# Patient Record
Sex: MALE | URBAN - METROPOLITAN AREA
[De-identification: names, ages, dates, MRNs, and addresses within clinical notes are randomized per-mention and may not be internally consistent; named-entity substitution may affect disease eponyms.]

---

## 2021-10-15 ENCOUNTER — EMERGENCY (EMERGENCY)
Facility: HOSPITAL | Age: 21
LOS: 1 days | Discharge: HOME | End: 2021-10-15
Attending: STUDENT IN AN ORGANIZED HEALTH CARE EDUCATION/TRAINING PROGRAM
Payer: COMMERCIAL

## 2021-10-15 VITALS — WEIGHT: 175.05 LBS

## 2021-10-15 DIAGNOSIS — F29 UNSPECIFIED PSYCHOSIS NOT DUE TO A SUBSTANCE OR KNOWN PHYSIOLOGICAL CONDITION: ICD-10-CM

## 2021-10-15 DIAGNOSIS — F19.10 OTHER PSYCHOACTIVE SUBSTANCE ABUSE, UNCOMPLICATED: ICD-10-CM

## 2021-10-15 LAB
ALBUMIN SERPL ELPH-MCNC: 4.4 G/DL — SIGNIFICANT CHANGE UP (ref 3.3–5.2)
ALP SERPL-CCNC: 84 U/L — SIGNIFICANT CHANGE UP (ref 40–120)
ALT FLD-CCNC: 30 U/L — SIGNIFICANT CHANGE UP
AMPHET UR-MCNC: NEGATIVE — SIGNIFICANT CHANGE UP
ANION GAP SERPL CALC-SCNC: 15 MMOL/L — SIGNIFICANT CHANGE UP (ref 5–17)
APAP SERPL-MCNC: <3 UG/ML — LOW (ref 10–26)
APPEARANCE UR: CLEAR — SIGNIFICANT CHANGE UP
AST SERPL-CCNC: 98 U/L — HIGH
BARBITURATES UR SCN-MCNC: NEGATIVE — SIGNIFICANT CHANGE UP
BASOPHILS # BLD AUTO: 0.05 K/UL — SIGNIFICANT CHANGE UP (ref 0–0.2)
BASOPHILS NFR BLD AUTO: 0.3 % — SIGNIFICANT CHANGE UP (ref 0–2)
BENZODIAZ UR-MCNC: NEGATIVE — SIGNIFICANT CHANGE UP
BILIRUB SERPL-MCNC: 1.5 MG/DL — SIGNIFICANT CHANGE UP (ref 0.4–2)
BILIRUB UR-MCNC: NEGATIVE — SIGNIFICANT CHANGE UP
BUN SERPL-MCNC: 8 MG/DL — SIGNIFICANT CHANGE UP (ref 8–20)
CALCIUM SERPL-MCNC: 9.5 MG/DL — SIGNIFICANT CHANGE UP (ref 8.6–10.2)
CHLORIDE SERPL-SCNC: 104 MMOL/L — SIGNIFICANT CHANGE UP (ref 98–107)
CO2 SERPL-SCNC: 21 MMOL/L — LOW (ref 22–29)
COCAINE METAB.OTHER UR-MCNC: NEGATIVE — SIGNIFICANT CHANGE UP
COLOR SPEC: YELLOW — SIGNIFICANT CHANGE UP
CREAT SERPL-MCNC: 0.82 MG/DL — SIGNIFICANT CHANGE UP (ref 0.5–1.3)
DIFF PNL FLD: NEGATIVE — SIGNIFICANT CHANGE UP
EOSINOPHIL # BLD AUTO: 0 K/UL — SIGNIFICANT CHANGE UP (ref 0–0.5)
EOSINOPHIL NFR BLD AUTO: 0 % — SIGNIFICANT CHANGE UP (ref 0–6)
ETHANOL SERPL-MCNC: <10 MG/DL — SIGNIFICANT CHANGE UP (ref 0–9)
GLUCOSE SERPL-MCNC: 114 MG/DL — HIGH (ref 70–99)
GLUCOSE UR QL: NEGATIVE MG/DL — SIGNIFICANT CHANGE UP
HCT VFR BLD CALC: 40.8 % — SIGNIFICANT CHANGE UP (ref 39–50)
HGB BLD-MCNC: 13.9 G/DL — SIGNIFICANT CHANGE UP (ref 13–17)
HIV 1 & 2 AB SERPL IA.RAPID: SIGNIFICANT CHANGE UP
IMM GRANULOCYTES NFR BLD AUTO: 0.4 % — SIGNIFICANT CHANGE UP (ref 0–1.5)
KETONES UR-MCNC: NEGATIVE — SIGNIFICANT CHANGE UP
LEUKOCYTE ESTERASE UR-ACNC: NEGATIVE — SIGNIFICANT CHANGE UP
LYMPHOCYTES # BLD AUTO: 1.82 K/UL — SIGNIFICANT CHANGE UP (ref 1–3.3)
LYMPHOCYTES # BLD AUTO: 10.9 % — LOW (ref 13–44)
MCHC RBC-ENTMCNC: 29.9 PG — SIGNIFICANT CHANGE UP (ref 27–34)
MCHC RBC-ENTMCNC: 34.1 GM/DL — SIGNIFICANT CHANGE UP (ref 32–36)
MCV RBC AUTO: 87.7 FL — SIGNIFICANT CHANGE UP (ref 80–100)
METHADONE UR-MCNC: NEGATIVE — SIGNIFICANT CHANGE UP
MONOCYTES # BLD AUTO: 0.68 K/UL — SIGNIFICANT CHANGE UP (ref 0–0.9)
MONOCYTES NFR BLD AUTO: 4.1 % — SIGNIFICANT CHANGE UP (ref 2–14)
NEUTROPHILS # BLD AUTO: 14.09 K/UL — HIGH (ref 1.8–7.4)
NEUTROPHILS NFR BLD AUTO: 84.3 % — HIGH (ref 43–77)
NITRITE UR-MCNC: NEGATIVE — SIGNIFICANT CHANGE UP
OPIATES UR-MCNC: NEGATIVE — SIGNIFICANT CHANGE UP
PCP SPEC-MCNC: SIGNIFICANT CHANGE UP
PCP UR-MCNC: NEGATIVE — SIGNIFICANT CHANGE UP
PH UR: 8 — SIGNIFICANT CHANGE UP (ref 5–8)
PLATELET # BLD AUTO: 327 K/UL — SIGNIFICANT CHANGE UP (ref 150–400)
POTASSIUM SERPL-MCNC: 3.8 MMOL/L — SIGNIFICANT CHANGE UP (ref 3.5–5.3)
POTASSIUM SERPL-SCNC: 3.8 MMOL/L — SIGNIFICANT CHANGE UP (ref 3.5–5.3)
PROT SERPL-MCNC: 7.4 G/DL — SIGNIFICANT CHANGE UP (ref 6.6–8.7)
PROT UR-MCNC: NEGATIVE MG/DL — SIGNIFICANT CHANGE UP
RBC # BLD: 4.65 M/UL — SIGNIFICANT CHANGE UP (ref 4.2–5.8)
RBC # FLD: 12.7 % — SIGNIFICANT CHANGE UP (ref 10.3–14.5)
SALICYLATES SERPL-MCNC: <0.6 MG/DL — LOW (ref 10–20)
SODIUM SERPL-SCNC: 140 MMOL/L — SIGNIFICANT CHANGE UP (ref 135–145)
SP GR SPEC: 1.01 — SIGNIFICANT CHANGE UP (ref 1.01–1.02)
THC UR QL: POSITIVE
UROBILINOGEN FLD QL: NEGATIVE MG/DL — SIGNIFICANT CHANGE UP
WBC # BLD: 16.7 K/UL — HIGH (ref 3.8–10.5)
WBC # FLD AUTO: 16.7 K/UL — HIGH (ref 3.8–10.5)

## 2021-10-15 PROCEDURE — 93010 ELECTROCARDIOGRAM REPORT: CPT

## 2021-10-15 PROCEDURE — 99218: CPT

## 2021-10-15 PROCEDURE — 90792 PSYCH DIAG EVAL W/MED SRVCS: CPT

## 2021-10-15 RX ORDER — HALOPERIDOL DECANOATE 100 MG/ML
5 INJECTION INTRAMUSCULAR EVERY 6 HOURS
Refills: 0 | Status: DISCONTINUED | OUTPATIENT
Start: 2021-10-15 | End: 2021-10-20

## 2021-10-15 RX ORDER — KETAMINE HYDROCHLORIDE 100 MG/ML
300 INJECTION INTRAMUSCULAR; INTRAVENOUS ONCE
Refills: 0 | Status: DISCONTINUED | OUTPATIENT
Start: 2021-10-15 | End: 2021-10-15

## 2021-10-15 RX ORDER — OLANZAPINE 15 MG/1
5 TABLET, FILM COATED ORAL ONCE
Refills: 0 | Status: COMPLETED | OUTPATIENT
Start: 2021-10-15 | End: 2021-10-15

## 2021-10-15 RX ORDER — DIPHENHYDRAMINE HCL 50 MG
50 CAPSULE ORAL EVERY 6 HOURS
Refills: 0 | Status: DISCONTINUED | OUTPATIENT
Start: 2021-10-15 | End: 2021-10-20

## 2021-10-15 RX ADMIN — KETAMINE HYDROCHLORIDE 300 MILLIGRAM(S): 100 INJECTION INTRAMUSCULAR; INTRAVENOUS at 13:20

## 2021-10-15 RX ADMIN — OLANZAPINE 5 MILLIGRAM(S): 15 TABLET, FILM COATED ORAL at 19:45

## 2021-10-15 NOTE — ED ADULT NURSE NOTE - CHIEF COMPLAINT QUOTE
as per ems pt was found outside his house and pt probably smoked something pt agitated yelling and uncooperative MD called and security called

## 2021-10-15 NOTE — ED PROVIDER NOTE - CLINICAL SUMMARY MEDICAL DECISION MAKING FREE TEXT BOX
22 yo male reportedly used drugs. Acute agitation, given Ketamine for patient and staff safety. Patient tachycardic but stable. Will reassess.

## 2021-10-15 NOTE — ED ADULT TRIAGE NOTE - CHIEF COMPLAINT QUOTE
as per ems pt was found outside his house and pt probably smoked something pt agitated yelling and uncooperative MD called as per ems pt was found outside his house and pt probably smoked something pt agitated yelling and uncooperative MD called and security called

## 2021-10-15 NOTE — ED ADULT NURSE NOTE - NSIMPLEMENTINTERV_GEN_ALL_ED
Implemented All Universal Safety Interventions:  [place] to call system. Call bell, personal items and telephone within reach. Instruct patient to call for assistance. Room bathroom lighting operational. Non-slip footwear when patient is off stretcher. Physically safe environment: no spills, clutter or unnecessary equipment. Stretcher in lowest position, wheels locked, appropriate side rails in place.

## 2021-10-15 NOTE — ED BEHAVIORAL HEALTH ASSESSMENT NOTE - HPI (INCLUDE ILLNESS QUALITY, SEVERITY, DURATION, TIMING, CONTEXT, MODIFYING FACTORS, ASSOCIATED SIGNS AND SYMPTOMS)
20 yo  male, born in , mother from [facility], HS grad, unemployed, lives with family, no formal PPH, tx, psych admissions, suicide attempt, no family h/o mental illness, unclear drug use h/o,  per EMS reports Pt has h/o umang dust use, no PMH bib EMS activated by mother secondary to agitation aggression and bizarre behavior.  Pt presents distracted, staring at ceiling at times, states he has been "traumatized", unable to articulate, internally preoccupied appears to be responding to internal stimuli, endorses AH saying to "die".  States he is being "traumatized", presents anxious, paranoia, fearful and is irritable.   Mood is labile crying at times restless.  Pt disorganized thinking and confused, states he does not understand what is happening.  Admits to vaping "wax" which is a strong version of THC.  Pt unable to give history due to acuity of current mental status and active psychosis.  Zyprexa Zydis 5 mg ordered stat.  Pt requires 1 to 1 for safety and agitation.  Mother is primary historian.  Mother unsure if Pt is using drugs.  Mother believes Pt has "Schizophrenia because he told mother he is hearing voices.  Pt has not slept for 8 days.  She reports Pt talking to himself and laughing to self and sometimes shouting when is shower.  Mother reports increasingly bizarre behavior.  Two days ago would take a shower every 15 minutes, then would dress with a jacket but no clothes underneath jacket.  Pt paranoid, told mother kids are trying to come in the house and trying to get into his head.  Pt reported to be pacing, threatening to "fuck you up" when speaking to mother.  Today mother asked him to cut grass which he started to do, then stopped,  was laughing staring at brent, ran inside took off clothes and did not recognize his mother which is when mothr called 911.  Mother wants Pt admitted psychiatrically.  Mother reports Pt has not been eating.  Mother reports Pt is unable to hold a job since graduating [employer]. 20 yo  male, born in , mother from [facility], HS grad, unemployed, name provider by mother to be [employer] brenda 01, lives with family, no formal PPH, tx, psych admissions, suicide attempt, no family h/o mental illness, unclear drug use h/o,  per EMS reports Pt has h/o umang dust use, no PMH bib EMS activated by mother secondary to agitation aggression and bizarre behavior.  Pt presents distracted, staring at ceiling at times, states he has been "traumatized", unable to articulate, internally preoccupied appears to be responding to internal stimuli, endorses AH saying to "die".  States he is being "traumatized", presents anxious, paranoia, fearful and is irritable.   Mood is labile crying at times restless.  Pt disorganized thinking and confused, states he does not understand what is happening.  Admits to vaping "wax" which is a strong version of THC.  Pt unable to give history due to acuity of current mental status and active psychosis.  Zyprexa Zydis 5 mg ordered stat.  Pt requires 1 to 1 for safety and agitation.  Mother is primary historian.  Mother unsure if Pt is using drugs.  Mother believes Pt has "Schizophrenia because he told mother he is hearing voices.  Pt has not slept for 8 days.  She reports Pt talking to himself and laughing to self and sometimes shouting when is shower.  Mother reports increasingly bizarre behavior.  Two days ago would take a shower every 15 minutes, then would dress with a jacket but no clothes underneath jacket.  Pt paranoid, told mother kids are trying to come in the house and trying to get into his head.  Pt reported to be pacing, threatening to "fuck you up" when speaking to mother.  Today mother asked him to cut grass which he started to do, then stopped,  was laughing staring at brent, ran inside took off clothes and did not recognize his mother which is when mother called 911.  Mother wants Pt admitted psychiatrically.  Mother reports Pt has not been eating.  Mother reports Pt is unable to hold a job since graduating [employer].

## 2021-10-15 NOTE — ED PROVIDER NOTE - CARE PLAN
1 Principal Discharge DX:	Psychosis, unspecified psychosis type  Secondary Diagnosis:	Substance abuse

## 2021-10-15 NOTE — ED BEHAVIORAL HEALTH ASSESSMENT NOTE - RISK ASSESSMENT
RF incomplete history, substance use vs first break, AH to "die", agitation.  PF supportive mother no prior psych h/o Unable to determine Suicide Risk

## 2021-10-15 NOTE — ED PROVIDER NOTE - ATTENDING CONTRIBUTION TO CARE
I, Bull Jacob, performed a face to face bedside interview with this patient regarding history of present illness, review of symptoms and relevant past medical, social and family history.  I completed an independent physical examination. I have communicated the patient’s plan of care and disposition with the resident.  20 year old male initially presented with acute agitation after vaping, requiring medical mgt to protetc self and others. Now awake and interactive, states he has been hearing voices for some time and smokes ot cope.  Gen: NAD, well appearing  CV: RRR  Pul: CTA b/l  Abd: Soft, non-distended, non-tender  Neuro: no focal deficits  Pt to be place don obs for inpt psych

## 2021-10-15 NOTE — ED BEHAVIORAL HEALTH ASSESSMENT NOTE - VIOLENCE RISK FACTORS:
Feeling of being under threat and being unable to control threat/Substance abuse/Affective dysregulation

## 2021-10-15 NOTE — ED BEHAVIORAL HEALTH ASSESSMENT NOTE - NSPRESENTSXS_PSY_ALL_CORE
Depressed mood/Anhedonia/Psychosis/Severe anxiety, agitation or panic/Refusal or inability to complete safety plan

## 2021-10-15 NOTE — ED ADULT NURSE NOTE - HPI (INCLUDE ILLNESS QUALITY, SEVERITY, DURATION, TIMING, CONTEXT, MODIFYING FACTORS, ASSOCIATED SIGNS AND SYMPTOMS)
pt arrives to  unit.  in yellow gown.  pt is awake alert.  weeping.  clinging to his mother.  pt ambulated to intake room waned by security for contraband.  pt states I don't know why I am here.  unable to obtain much information at his time.  pt eyes wide open looking at staff.  pt to bathroom cooperative with urine specimen escorted to room .  make aware to stay over night. pt denies med/pph hx.  then stating I miss my girlfriend.  I miss my mom.  starring at this rn.  then silent.  I am sorry for anything I did.  pt offered food and po took both.  let resting at this time

## 2021-10-15 NOTE — ED PROVIDER NOTE - OBJECTIVE STATEMENT
22 yo male with no pmh presents to the ED for aggressive behavior. Patient was found outside his house, yelling. It is believed by police that he was using Elmer Dust due to history in the past. Patient yelling in ED jumped off stretcher, require treatment for agitation.

## 2021-10-15 NOTE — ED BEHAVIORAL HEALTH ASSESSMENT NOTE - DESCRIPTION
HS grad, [place], unemployed ICU Vital Signs Last 24 Hrs  T(C): 37.3 (15 Oct 2021 16:49), Max: 37.3 (15 Oct 2021 16:49)  T(F): 99.2 (15 Oct 2021 16:49), Max: 99.2 (15 Oct 2021 16:49)  HR: 100 (15 Oct 2021 16:49) (98 - 145)  BP: 130/60 (15 Oct 2021 16:49) (130/60 - 165/92)  BP(mean): --  ABP: --  ABP(mean): --  RR: 16 (15 Oct 2021 16:49) (16 - 22)  SpO2: 100% (15 Oct 2021 16:49) (100% - 100%) none

## 2021-10-15 NOTE — ED BEHAVIORAL HEALTH ASSESSMENT NOTE - DIFFERENTIAL
Substance induced psychosis Substance induced psychosis    COVID Exposure Screen- Patient    1.         *Have you had a COVID-19 test in the last 90 days?  ( x ) Yes  (  ) No   (  ) Unknown- Reason: _____    2.         *Have you tested positive for COVID-19 antibodies? (  ) Yes   (  ) No   ( x ) Unknown- Reason:    3.         *Have you received 2 doses of the COVID-19 vaccine? (  ) Yes   (x  ) No   (  ) Unknown- Reason: _____    4.         *In the past 10 days, have you been around anyone with a positive COVID-19 test?* (  ) Yes   (  ) No   ( x ) Unknown- Reason: ____    5.         *Have you been out of [place] within the past 10 days?* (  ) Yes   (x  ) No   (  ) Unknown- Reason: _____

## 2021-10-15 NOTE — ED BEHAVIORAL HEALTH ASSESSMENT NOTE - SUMMARY
20 yo  male, born in , mother from [facility], HS grad, unemployed, lives with family, no formal PPH, tx, psych admissions, suicide attempt, no family h/o mental illness, unclear drug use h/o,  per EMS reports Pt has h/o umang dust use, no PMH bib EMS activated by mother secondary to agitation aggression and bizarre behavior.  Pt presents distracted, staring at ceiling at times, states he has been "traumatized", unable to articulate, internally preoccupied appears to be responding to internal stimuli, endorses AH saying to "die".  States he is being "traumatized", presents anxious, paranoia, fearful and is irritable.   Mood is labile crying at times restless.  Pt disorganized thinking and confused, states he does not understand what is happening.  Admits to vaping "wax" which is a strong version of THC.  Pt unable to give history due to acuity of current mental status and active psychosis.  Zyprexa Zydis 5 mg ordered stat.  Pt requires 1 to 1 for safety and agitation.  Mother is primary historian.  Mother unsure if Pt is using drugs.  Mother believes Pt has "Schizophrenia because he told mother he is hearing voices.  Pt has not slept for 8 days.  She reports Pt talking to himself and laughing to self and sometimes shouting when is shower.  Mother reports increasingly bizarre behavior.  Two days ago would take a shower every 15 minutes, then would dress with a jacket but no clothes underneath jacket.  Pt paranoid, told mother kids are trying to come in the house and trying to get into his head.  Pt reported to be pacing, threatening to "fuck you up" when speaking to mother.  Today mother asked him to cut grass which he started to do, then stopped,  was laughing staring at brent, ran inside took off clothes and did not recognize his mother which is when mother called 911.  Mother wants Pt admitted psychiatrically.  Mother reports Pt has not been eating.  Mother reports Pt is unable to hold a job since graduating [employer].  Pt is a danger to self and others and requires in pt psych admission due to psychosis and for safety.  Await medical clearance and bed availability.

## 2021-10-15 NOTE — ED BEHAVIORAL HEALTH ASSESSMENT NOTE - DETAILS
Pt is verbally aggressive and threatening and hs been physically aggressive with mother. voices tell him to "die".  Did not answer when asked if voices tell him to hurt self to follow na Pt reports AH telling his to die".

## 2021-10-15 NOTE — ED CDU PROVIDER INITIAL DAY NOTE - OBJECTIVE STATEMENT
20 yo male with no pmh presents to the ED for aggressive behavior. Patient was found outside his house, yelling. It is believed by police that he was using Elmer Dust due to history in the past. Patient yelling in ED jumped off stretcher, require treatment for agitation.

## 2021-10-16 LAB
SARS-COV-2 RNA SPEC QL NAA+PROBE: SIGNIFICANT CHANGE UP

## 2021-10-16 PROCEDURE — 99224: CPT

## 2021-10-17 PROCEDURE — 99211 OFF/OP EST MAY X REQ PHY/QHP: CPT

## 2021-10-17 PROCEDURE — 99224: CPT

## 2021-10-17 RX ORDER — OLANZAPINE 15 MG/1
5 TABLET, FILM COATED ORAL AT BEDTIME
Refills: 0 | Status: DISCONTINUED | OUTPATIENT
Start: 2021-10-17 | End: 2021-10-20

## 2021-10-17 RX ADMIN — OLANZAPINE 5 MILLIGRAM(S): 15 TABLET, FILM COATED ORAL at 22:04

## 2021-10-17 NOTE — ED ADULT NURSE REASSESSMENT NOTE - COMFORT CARE
ambulated to bathroom/meal provided/plan of care explained
ambulated to bathroom/meal provided/plan of care explained/po fluids offered
meal provided/plan of care explained
plan of care explained
darkened lights

## 2021-10-17 NOTE — ED BEHAVIORAL HEALTH NOTE - BEHAVIORAL HEALTH NOTE
Social work note:  Pt is uninsured; limitations on what facility can accept for inpatient psych.   contacted [facility]; spoke with [facility]; spoke with Faye - no beds available. Worked paged A.NORMA at [facility] for bed census. SW to follow

## 2021-10-17 NOTE — ED BEHAVIORAL HEALTH NOTE - BEHAVIORAL HEALTH NOTE
PROGRESS NOTE: 10-17-21 @ 12:11  	  • Reason for Ongoing Consultation: 	    ID: 20 yo Male with HEALTH ISSUES - PROBLEM Dx:  Psychosis, unspecified psychosis type    Substance abuse            INTERVAL DATA:   • Interval Chief Complaint: "Its no big deal.."  • Interval History:   Pt report feeling better.  Denies AH yesterday and today.   Sitting up in chair watching TV.  Reports he no longer hears voices.  Thinking remains somewhat disorganized, poor insight into substance use, saying It is "no big deal".  Minimizing MJ use.  Mother reports psychotic s/s predated ingestion of wax he vaped.  Unclear if psychosis is secondary to MJ use or 1st break.  Will hold for reeval in am for possible psych admission.  Continue Zyprexa 5 mg hs    REVIEW OF SYSTEMS:   • Constitutional Symptoms	No complaints  • Eyes	No complaints  • Ears / Nose / Throat / Mouth	No complaints  • Cardiovascular	No complaints  • Respiratory	No complaints  • Gastrointestinal	No complaints  • Genitourinary	No complaints  • Musculoskeletal	No complaints  • Skin	No complaints  • Neurological	No complaints  • Psychiatric (see HPI)	See HPI  • Endocrine	No complaints  • Hematologic / Lymphatic	No complaints  • Allergic / Immunologic	No complaints    REVIEW OF VITALS/LABS/IMAGING/INVESTIGATIONS:   • Vital signs reviewed: Yes  • Vital Signs:	    T(C): 36.6 (10-17-21 @ 04:31), Max: 36.9 (10-16-21 @ 19:40)  HR: 65 (10-17-21 @ 04:31) (60 - 79)  BP: 105/65 (10-17-21 @ 04:31) (102/62 - 114/60)  RR: 18 (10-17-21 @ 04:31) (18 - 20)  SpO2: 99% (10-17-21 @ 04:31) (97% - 99%)    • Available labs reviewed: Yes  • Available Lab Results:                           13.9   16.70 )-----------( 327      ( 15 Oct 2021 18:11 )             40.8     10-15    140  |  104  |  8.0  ----------------------------<  114<H>  3.8   |  21.0<L>  |  0.82    Ca    9.5      15 Oct 2021 18:11    TPro  7.4  /  Alb  4.4  /  TBili  1.5  /  DBili  x   /  AST  98<H>  /  ALT  30  /  AlkPhos  84  10-15    LIVER FUNCTIONS - ( 15 Oct 2021 18:11 )  Alb: 4.4 g/dL / Pro: 7.4 g/dL / ALK PHOS: 84 U/L / ALT: 30 U/L / AST: 98 U/L / GGT: x             Urinalysis Basic - ( 15 Oct 2021 21:00 )    Color: Yellow / Appearance: Clear / S.010 / pH: x  Gluc: x / Ketone: Negative  / Bili: Negative / Urobili: Negative mg/dL   Blood: x / Protein: Negative mg/dL / Nitrite: Negative   Leuk Esterase: Negative / RBC: x / WBC x   Sq Epi: x / Non Sq Epi: x / Bacteria: x          MEDICATIONS:      PRN Medications:  • PRN Medications since last evaluation	  • PRN Details	    Current Medications:   diphenhydrAMINE Injectable 50 milliGRAM(s) IntraMuscular every 6 hours PRN  haloperidol    Injectable 5 milliGRAM(s) IntraMuscular every 6 hours PRN  LORazepam   Injectable 2 milliGRAM(s) IntraMuscular every 6 hours PRN     Medication Side Effects:  • Medication Side Effects or Adverse Reactions (new or ongoing)	None known    MENTAL STATUS EXAM:   • Level of Consciousness	Alert  • General Appearance	Well developed  • Body Habitus	Well nourished  • Hygiene	Good  • Grooming	Good  • Behavior	Cooperative  • Eye Contact	Good  • Relatedness	fair  • Impulse Control	Normal  • Muscle Tone / Strength	Normal muscle tone/strength  • Abnormal Movements	No abnormal movements  • Gait / Station	Normal gait / station  • Speech Volume	Normal  • Speech Rate	Normal  • Speech Spontaneity	Normal  • Speech Articulation	Normal  • Mood	Normal  • Affect Quality	Euthymic  • Affect Range	constricted  • Affect Congruence	Congruent  • Thought Process	some disorganization  • Thought Associations	Normal  • Thought Content	Unremarkable  • Perceptions	No abnormalities  • Oriented to Time	Yes  • Oriented to Place	Yes  • Oriented to Situation	Yes  • Oriented to Person	Yes  • Attention / Concentration	Normal  • Estimated Intelligence	Average  • Recent Memory	Normal  • Remote Memory	Normal  • Fund of Knowledge	Normal  • Language	No abnormalities noted  • Judgment (regarding everyday events)	Fair  • Insight (regarding psychiatric illness)	Fair    SUICIDALITY:   • Suicidality (Interval)	none known    HOMICIDALITY/AGGRESSION:   • Homicidality/Aggression	none known    DIAGNOSIS DSM-V:    Psychiatric Diagnosis (Corresponds to DSM-IV Axis I, II):   HEALTH ISSUES - PROBLEM Dx:  Psychosis, unspecified psychosis type    Substance abuse             Medical Diagnosis (Corresponds to DSM-IV Axis III):  • Axis III	      ASSESSMENT OF CURRENT CONDITION:   Summary (include case differential, formulation and patient response to therapy):   Continue somewhat disorganized with poor insight.  Psychosis improved.  No beds available for in pt admission.  Reeval in am for dispo.  Risk Assessment (consider static vs modifiable risk factors and protective factors; comment on level of risk for dangerous behavior):     PLAN  reeval am.  COVID Exposure Screen- Patient    1.         *Have you had a COVID-19 test in the last 90 days?  ( x ) Yes  (  ) No   (  ) Unknown- Reason: _____    2.         *Have you tested positive for COVID-19 antibodies? (  ) Yes   (  ) No   (x  ) Unknown- Reason:    3.         *Have you received 2 doses of the COVID-19 vaccine? (  ) Yes   ( x ) No   (  ) Unknown- Reason: _____    4.         *In the past 10 days, have you been around anyone with a positive COVID-19 test?* (  ) Yes   (  ) No   ( x ) Unknown- Reason: ____    5.         *Have you been out of [place] within the past 10 days?* (  ) Yes   (  x) No   (  ) Unknown- Reason: _____

## 2021-10-18 VITALS
OXYGEN SATURATION: 97 % | TEMPERATURE: 98 F | RESPIRATION RATE: 18 BRPM | HEART RATE: 85 BPM | SYSTOLIC BLOOD PRESSURE: 102 MMHG | DIASTOLIC BLOOD PRESSURE: 73 MMHG

## 2021-10-18 LAB
HCT VFR BLD CALC: 42.6 % — SIGNIFICANT CHANGE UP (ref 39–50)
HGB BLD-MCNC: 14.4 G/DL — SIGNIFICANT CHANGE UP (ref 13–17)
MCHC RBC-ENTMCNC: 31.1 PG — SIGNIFICANT CHANGE UP (ref 27–34)
MCHC RBC-ENTMCNC: 33.8 GM/DL — SIGNIFICANT CHANGE UP (ref 32–36)
MCV RBC AUTO: 92 FL — SIGNIFICANT CHANGE UP (ref 80–100)
PLATELET # BLD AUTO: 332 K/UL — SIGNIFICANT CHANGE UP (ref 150–400)
RBC # BLD: 4.63 M/UL — SIGNIFICANT CHANGE UP (ref 4.2–5.8)
RBC # FLD: 13.1 % — SIGNIFICANT CHANGE UP (ref 10.3–14.5)
SARS-COV-2 RNA SPEC QL NAA+PROBE: SIGNIFICANT CHANGE UP
WBC # BLD: 6.64 K/UL — SIGNIFICANT CHANGE UP (ref 3.8–10.5)
WBC # FLD AUTO: 6.64 K/UL — SIGNIFICANT CHANGE UP (ref 3.8–10.5)

## 2021-10-18 PROCEDURE — 99217: CPT

## 2021-10-18 PROCEDURE — U0005: CPT

## 2021-10-18 PROCEDURE — 85027 COMPLETE CBC AUTOMATED: CPT

## 2021-10-18 PROCEDURE — 85025 COMPLETE CBC W/AUTO DIFF WBC: CPT

## 2021-10-18 PROCEDURE — 86703 HIV-1/HIV-2 1 RESULT ANTBDY: CPT

## 2021-10-18 PROCEDURE — 80053 COMPREHEN METABOLIC PANEL: CPT

## 2021-10-18 PROCEDURE — 80307 DRUG TEST PRSMV CHEM ANLYZR: CPT

## 2021-10-18 PROCEDURE — 99285 EMERGENCY DEPT VISIT HI MDM: CPT | Mod: 25

## 2021-10-18 PROCEDURE — G0378: CPT

## 2021-10-18 PROCEDURE — U0003: CPT

## 2021-10-18 PROCEDURE — 81003 URINALYSIS AUTO W/O SCOPE: CPT

## 2021-10-18 PROCEDURE — 93005 ELECTROCARDIOGRAM TRACING: CPT

## 2021-10-18 PROCEDURE — 96372 THER/PROPH/DIAG INJ SC/IM: CPT

## 2021-10-18 PROCEDURE — 70450 CT HEAD/BRAIN W/O DYE: CPT | Mod: MA

## 2021-10-18 PROCEDURE — 36415 COLL VENOUS BLD VENIPUNCTURE: CPT

## 2021-10-18 PROCEDURE — 70450 CT HEAD/BRAIN W/O DYE: CPT | Mod: 26,MA

## 2021-10-18 PROCEDURE — 82962 GLUCOSE BLOOD TEST: CPT

## 2021-10-18 NOTE — ED ADULT NURSE REASSESSMENT NOTE - CONDITION
unchanged

## 2021-10-18 NOTE — ED CDU PROVIDER SUBSEQUENT DAY NOTE - NSICDXPASTMEDICALHX_GEN_ALL_CORE_FT
PAST MEDICAL HISTORY:  No pertinent past medical history

## 2021-10-18 NOTE — ED CDU PROVIDER SUBSEQUENT DAY NOTE - EYES NEGATIVE STATEMENT, MLM
no discharge, no irritation, no pain, no redness, and no visual changes.

## 2021-10-18 NOTE — ED CDU PROVIDER SUBSEQUENT DAY NOTE - ENMT, MLM
Airway patent. Nasal mucosa clear. Mouth with normal mucosa. Throat has no vesicles, no oropharyngeal exudates and uvula is midline.

## 2021-10-18 NOTE — ED ADULT NURSE REASSESSMENT NOTE - GENERAL PATIENT STATE
comfortable appearance/resting/sleeping
resting/sleeping
resting/sleeping
comfortable appearance/resting/sleeping
comfortable appearance/resting/sleeping
resting/sleeping
vitals obtained/cooperative/resting/sleeping
comfortable appearance/resting/sleeping
resting/sleeping
resting/sleeping
comfortable appearance/cooperative
comfortable appearance/cooperative/resting/sleeping
cooperative/resting/sleeping

## 2021-10-18 NOTE — ED CDU PROVIDER SUBSEQUENT DAY NOTE - HISTORY
No acute events overnight, pending psych placement No acute events overnight, pt arrived with hallcuinations that was then exacerbated by drug use. Acute agitation due to substance abuse overlying psychosis. He was sedated and is now calm and awaiting inpt psych

## 2021-10-18 NOTE — ED CDU PROVIDER SUBSEQUENT DAY NOTE - SKIN NEGATIVE STATEMENT, MLM
no abrasions, no jaundice, no lesions, no pruritis, and no rashes.

## 2021-10-18 NOTE — ED CDU PROVIDER DISPOSITION NOTE - CLINICAL COURSE
20 yo male with no pmh presents to the ED for aggressive behavior. Patient was found outside his house, yelling. It is believed by police that he was using Elmer Dust due to history in the past. Patient yelling in ED jumped off stretcher, require treatment for agitation.    patient medically cleared. patient seen by psych and require inpatient placement. patient accepted by [facility].

## 2021-10-18 NOTE — ED CDU PROVIDER SUBSEQUENT DAY NOTE - ENMT NEGATIVE STATEMENT, MLM
Ears: no ear pain and no hearing problems. Nose: no nasal congestion and no nasal drainage. Mouth/Throat: no dysphagia, no hoarseness and no throat pain. Neck: no lumps, no pain, no stiffness and no swollen glands

## 2021-10-18 NOTE — ED CDU PROVIDER SUBSEQUENT DAY NOTE - NSICDXPASTSURGICALHX_GEN_ALL_CORE_FT
PAST SURGICAL HISTORY:  No significant past surgical history

## 2021-10-18 NOTE — ED CDU PROVIDER SUBSEQUENT DAY NOTE - NSICDXNOPASTMEDICALHX_GEN_ALL_ED
<-- Click to add NO pertinent Past Medical History

## 2021-10-18 NOTE — ED ADULT NURSE REASSESSMENT NOTE - STATUS
awaiting transfer, no change
reassessment
awaiting transfer, no change

## 2021-10-18 NOTE — ED CDU PROVIDER SUBSEQUENT DAY NOTE - HEME LYMPH, MLM
No adenopathy or splenomegaly. No cervical or inguinal lymphadenopathy.

## 2021-10-18 NOTE — ED CDU PROVIDER SUBSEQUENT DAY NOTE - GASTROINTESTINAL NEGATIVE STATEMENT, MLM
no abdominal pain, no bloating, no constipation, no diarrhea, no nausea and no vomiting.

## 2021-10-18 NOTE — ED CDU PROVIDER SUBSEQUENT DAY NOTE - NEURO NEGATIVE STATEMENT, MLM
no loss of consciousness, no gait abnormality, no headache, no sensory deficits, and no weakness.

## 2021-10-18 NOTE — ED ADULT NURSE REASSESSMENT NOTE - NS ED NURSE REASSESS COMMENT FT1
Assumed care of patient at 1915  Pt alert and cooperative.   patient sitting in room watching tv.  no c/o  NAD noted patient requesting food to eat sandwich offered and tolerated well.  Will monitor and chart chagnes and maintain safe environment
Assumed care of patient.  Pt alert and cooperative.  Pt resting at long interval  Will monitor and chart changes and maintain safe environment
Patient out of bed sitting in chair.  Provided patient with hospital phone as requested and contacting his family.  No attempts to harm self or others.  Denies auditory hallucinations.  Safety maintained.
Pt re-swabbed for Covid for possible transfer in am
assumed care of pt. pt is awake, alert and oriented x3. pt denies si, hi, avh. pt is compliant with request for repeat lab work for cbc. pt is not in any acute distress. awaiting repeat ekg.
pt compliant with head ct. pt has been calm and cooperative, educated on plan of care to be transferred to [facility] for inpatient tx. pt has made no attempts to harm self or others, self isolating to his room. pt provided food tray with good appetite consuming all meals and po fluids.
Patient informed family called [facility] requesting he call them but patient declined.  Patient ate 50% of his meal. Patient withdrawn to his bed.  No attempts to harm self or others and safety maintained.
Patient resting and watching TV in his room much of the day. NP reassessed pt and determined pt should be held overnight to evaluate for possible d/c if pt continues to clear. Pt less disorganized, but remains bizarre at times. Ate well, polite and cooperative with care.
Assumed care of patient at 0715.  Patient resting in bed appears to be sleeping with no distress and regular non labored breathing noted.  Safety of patient maintained.
Patient denies auditory hallucinations but reports he feels "stressed".  No attempts to harm self or others.  Withdrawn to his room.  Patient provided breakfast and ate 100% of his meal.  Provided phone and contacted his mother.  Safety of patient maintained.

## 2021-10-18 NOTE — ED CDU PROVIDER SUBSEQUENT DAY NOTE - NORMAL, MLM
chikis all pertinent systems normal

## 2021-10-18 NOTE — ED CDU PROVIDER SUBSEQUENT DAY NOTE - MUSCULOSKELETAL NEGATIVE STATEMENT, MLM
no back pain, no gout, no musculoskeletal pain, no neck pain, and no weakness.

## 2021-10-18 NOTE — ED CDU PROVIDER SUBSEQUENT DAY NOTE - MEDICAL DECISION MAKING DETAILS
pt medically cleared and pending inpt psych
pt medically cleared and pending inpt psych
pt medically cleared and pending psych palcement

## 2021-10-18 NOTE — ED CDU PROVIDER SUBSEQUENT DAY NOTE - RESPIRATORY NEGATIVE STATEMENT, MLM
no chest pain, no cough, and no shortness of breath.

## 2021-10-20 NOTE — CHART NOTE - NSCHARTNOTEFT_GEN_A_CORE
As per NP, patient will need inpatient psych admission on 9.27 status. 9.27 legals completed by Dr. Noe, Dr. Winkler, and Assistant Director of Nursing Angélica. The patient is uninsured and there are no beds available at [facility] or [facility]. SW will continue to follow the case for placement.
ROQUE Note: ROQUE made aware BH provider pt is in need of inpt psych tx, registration updated pt's info, pt is uninsured. ROQUE placed call to [facility], per TIM Dowell no beds. ROQUE spoke to staff at , no beds. ROQUE spoke to Darlin at [facility] who reports beds are available, ROQUE faxed chart for review, awaiting call back
SW Note: completed ins auth for admit to [facility]. Spoke with Jabier from [facility] [phone]. Auth approved for 7 days 10/18-10/24. Auth# [id number]. Info forwarded to [facility] UR dept.
SW Note: Discussed pts care in  rounds. plan is to transfer pt for inpt psychiatric care. 2PC legals charted. Reviewed labs. pt will need a repeat EKG and WBC. Also discussed with Dr. Allen ordering a head CT since this event is listed as a first event with no psych hx. Dr. Allen agreed and ordered CT.  RN aware.   Contacted [facility] admissions [phone], spoke with robert. bed on hold.  Also spoke with pts mother Corin [phone] and confirmed the pt is insured under her plan, she works for [employer]. Notified ED registration.

## 2021-10-28 ENCOUNTER — APPOINTMENT (OUTPATIENT)
Dept: FAMILY MEDICINE | Facility: CLINIC | Age: 21
End: 2021-10-28

## 2022-03-10 NOTE — ED ADULT NURSE NOTE - ED STAT RN HANDOFF DETAILS
alert , pleasant, well nourished, in no acute distress PAtient was transferred to  at this time. PIV was removed. PAtient was in a yellow gown and security was notified. PAtient is currently awake and alert but upset. PAtient report given.

## 2022-03-11 PROBLEM — Z00.00 ENCOUNTER FOR PREVENTIVE HEALTH EXAMINATION: Status: ACTIVE | Noted: 2022-03-11

## 2023-08-01 NOTE — ED BEHAVIORAL HEALTH ASSESSMENT NOTE - NS ED BHA MSE PERCEPTIONS COMMAND YN
Patient Amelia Tuckerier contacting office confused regarding authorization for her stress test tomorrow. Yes

## 2023-09-18 ENCOUNTER — EMERGENCY (EMERGENCY)
Facility: HOSPITAL | Age: 23
LOS: 1 days | Discharge: OTHER INSTITUTION | End: 2023-09-18
Attending: STUDENT IN AN ORGANIZED HEALTH CARE EDUCATION/TRAINING PROGRAM
Payer: COMMERCIAL

## 2023-09-18 VITALS
OXYGEN SATURATION: 99 % | SYSTOLIC BLOOD PRESSURE: 135 MMHG | DIASTOLIC BLOOD PRESSURE: 67 MMHG | HEART RATE: 130 BPM | RESPIRATION RATE: 16 BRPM | TEMPERATURE: 100 F

## 2023-09-18 LAB
ALBUMIN SERPL ELPH-MCNC: 4.8 G/DL — SIGNIFICANT CHANGE UP (ref 3.3–5.2)
ALP SERPL-CCNC: 94 U/L — SIGNIFICANT CHANGE UP (ref 40–120)
ALT FLD-CCNC: 41 U/L — HIGH
ANION GAP SERPL CALC-SCNC: 23 MMOL/L — HIGH (ref 5–17)
APAP SERPL-MCNC: <3 UG/ML — LOW (ref 10–26)
APPEARANCE UR: CLEAR — SIGNIFICANT CHANGE UP
APTT BLD: 25.2 SEC — SIGNIFICANT CHANGE UP (ref 24.5–35.6)
AST SERPL-CCNC: 39 U/L — SIGNIFICANT CHANGE UP
BACTERIA # UR AUTO: NEGATIVE — SIGNIFICANT CHANGE UP
BASE EXCESS BLDV CALC-SCNC: -2.9 MMOL/L — LOW (ref -2–3)
BASOPHILS # BLD AUTO: 0.06 K/UL — SIGNIFICANT CHANGE UP (ref 0–0.2)
BASOPHILS NFR BLD AUTO: 0.4 % — SIGNIFICANT CHANGE UP (ref 0–2)
BILIRUB SERPL-MCNC: 1.1 MG/DL — SIGNIFICANT CHANGE UP (ref 0.4–2)
BILIRUB UR-MCNC: NEGATIVE — SIGNIFICANT CHANGE UP
BUN SERPL-MCNC: 16.8 MG/DL — SIGNIFICANT CHANGE UP (ref 8–20)
CA-I SERPL-SCNC: 1.14 MMOL/L — LOW (ref 1.15–1.33)
CALCIUM SERPL-MCNC: 10.1 MG/DL — SIGNIFICANT CHANGE UP (ref 8.4–10.5)
CHLORIDE BLDV-SCNC: 101 MMOL/L — SIGNIFICANT CHANGE UP (ref 96–108)
CHLORIDE SERPL-SCNC: 97 MMOL/L — SIGNIFICANT CHANGE UP (ref 96–108)
CO2 SERPL-SCNC: 18 MMOL/L — LOW (ref 22–29)
COLOR SPEC: YELLOW — SIGNIFICANT CHANGE UP
CREAT SERPL-MCNC: 1.21 MG/DL — SIGNIFICANT CHANGE UP (ref 0.5–1.3)
DIFF PNL FLD: ABNORMAL
EGFR: 87 ML/MIN/1.73M2 — SIGNIFICANT CHANGE UP
EOSINOPHIL # BLD AUTO: 0.01 K/UL — SIGNIFICANT CHANGE UP (ref 0–0.5)
EOSINOPHIL NFR BLD AUTO: 0.1 % — SIGNIFICANT CHANGE UP (ref 0–6)
EPI CELLS # UR: NEGATIVE — SIGNIFICANT CHANGE UP
ETHANOL SERPL-MCNC: <10 MG/DL — SIGNIFICANT CHANGE UP (ref 0–9)
GAS PNL BLDV: 134 MMOL/L — LOW (ref 136–145)
GAS PNL BLDV: SIGNIFICANT CHANGE UP
GLUCOSE BLDV-MCNC: 133 MG/DL — HIGH (ref 70–99)
GLUCOSE SERPL-MCNC: 125 MG/DL — HIGH (ref 70–99)
GLUCOSE UR QL: NEGATIVE MG/DL — SIGNIFICANT CHANGE UP
HCO3 BLDV-SCNC: 20 MMOL/L — LOW (ref 22–29)
HCT VFR BLD CALC: 40.7 % — SIGNIFICANT CHANGE UP (ref 39–50)
HCT VFR BLDA CALC: 44 % — SIGNIFICANT CHANGE UP
HGB BLD CALC-MCNC: 14.7 G/DL — SIGNIFICANT CHANGE UP (ref 12.6–17.4)
HGB BLD-MCNC: 14.1 G/DL — SIGNIFICANT CHANGE UP (ref 13–17)
IMM GRANULOCYTES NFR BLD AUTO: 0.5 % — SIGNIFICANT CHANGE UP (ref 0–0.9)
INR BLD: 1.08 RATIO — SIGNIFICANT CHANGE UP (ref 0.85–1.18)
KETONES UR-MCNC: NEGATIVE — SIGNIFICANT CHANGE UP
LACTATE BLDV-MCNC: 4.2 MMOL/L — CRITICAL HIGH (ref 0.5–2)
LEUKOCYTE ESTERASE UR-ACNC: NEGATIVE — SIGNIFICANT CHANGE UP
LYMPHOCYTES # BLD AUTO: 14.1 % — SIGNIFICANT CHANGE UP (ref 13–44)
LYMPHOCYTES # BLD AUTO: 2.31 K/UL — SIGNIFICANT CHANGE UP (ref 1–3.3)
MCHC RBC-ENTMCNC: 30.6 PG — SIGNIFICANT CHANGE UP (ref 27–34)
MCHC RBC-ENTMCNC: 34.6 GM/DL — SIGNIFICANT CHANGE UP (ref 32–36)
MCV RBC AUTO: 88.3 FL — SIGNIFICANT CHANGE UP (ref 80–100)
MONOCYTES # BLD AUTO: 1.07 K/UL — HIGH (ref 0–0.9)
MONOCYTES NFR BLD AUTO: 6.5 % — SIGNIFICANT CHANGE UP (ref 2–14)
NEUTROPHILS # BLD AUTO: 12.87 K/UL — HIGH (ref 1.8–7.4)
NEUTROPHILS NFR BLD AUTO: 78.4 % — HIGH (ref 43–77)
NITRITE UR-MCNC: NEGATIVE — SIGNIFICANT CHANGE UP
PCO2 BLDV: 26 MMHG — LOW (ref 42–55)
PH BLDV: 7.5 — HIGH (ref 7.32–7.43)
PH UR: 6.5 — SIGNIFICANT CHANGE UP (ref 5–8)
PLATELET # BLD AUTO: 406 K/UL — HIGH (ref 150–400)
PO2 BLDV: 177 MMHG — HIGH (ref 25–45)
POTASSIUM BLDV-SCNC: 2.7 MMOL/L — CRITICAL LOW (ref 3.5–5.1)
POTASSIUM SERPL-MCNC: 2.8 MMOL/L — CRITICAL LOW (ref 3.5–5.3)
POTASSIUM SERPL-SCNC: 2.8 MMOL/L — CRITICAL LOW (ref 3.5–5.3)
PROT SERPL-MCNC: 8.2 G/DL — SIGNIFICANT CHANGE UP (ref 6.6–8.7)
PROT UR-MCNC: 15
PROTHROM AB SERPL-ACNC: 12 SEC — SIGNIFICANT CHANGE UP (ref 9.5–13)
RBC # BLD: 4.61 M/UL — SIGNIFICANT CHANGE UP (ref 4.2–5.8)
RBC # FLD: 12.9 % — SIGNIFICANT CHANGE UP (ref 10.3–14.5)
RBC CASTS # UR COMP ASSIST: ABNORMAL /HPF (ref 0–4)
SALICYLATES SERPL-MCNC: <0.6 MG/DL — LOW (ref 10–20)
SAO2 % BLDV: 100 % — SIGNIFICANT CHANGE UP
SODIUM SERPL-SCNC: 138 MMOL/L — SIGNIFICANT CHANGE UP (ref 135–145)
SP GR SPEC: 1 — LOW (ref 1.01–1.02)
UROBILINOGEN FLD QL: NEGATIVE MG/DL — SIGNIFICANT CHANGE UP
WBC # BLD: 16.4 K/UL — HIGH (ref 3.8–10.5)
WBC # FLD AUTO: 16.4 K/UL — HIGH (ref 3.8–10.5)
WBC UR QL: NEGATIVE /HPF — SIGNIFICANT CHANGE UP (ref 0–5)

## 2023-09-18 PROCEDURE — 99285 EMERGENCY DEPT VISIT HI MDM: CPT

## 2023-09-18 PROCEDURE — 71045 X-RAY EXAM CHEST 1 VIEW: CPT | Mod: 26

## 2023-09-18 PROCEDURE — 93010 ELECTROCARDIOGRAM REPORT: CPT

## 2023-09-18 RX ORDER — MIDAZOLAM HYDROCHLORIDE 1 MG/ML
4 INJECTION, SOLUTION INTRAMUSCULAR; INTRAVENOUS ONCE
Refills: 0 | Status: DISCONTINUED | OUTPATIENT
Start: 2023-09-18 | End: 2023-09-18

## 2023-09-18 RX ORDER — KETAMINE HYDROCHLORIDE 100 MG/ML
150 INJECTION INTRAMUSCULAR; INTRAVENOUS ONCE
Refills: 0 | Status: DISCONTINUED | OUTPATIENT
Start: 2023-09-18 | End: 2023-09-18

## 2023-09-18 RX ORDER — SODIUM CHLORIDE 9 MG/ML
2200 INJECTION INTRAMUSCULAR; INTRAVENOUS; SUBCUTANEOUS ONCE
Refills: 0 | Status: COMPLETED | OUTPATIENT
Start: 2023-09-18 | End: 2023-09-18

## 2023-09-18 RX ORDER — POTASSIUM CHLORIDE 20 MEQ
10 PACKET (EA) ORAL
Refills: 0 | Status: COMPLETED | OUTPATIENT
Start: 2023-09-18 | End: 2023-09-19

## 2023-09-18 RX ORDER — POTASSIUM CHLORIDE 20 MEQ
40 PACKET (EA) ORAL ONCE
Refills: 0 | Status: COMPLETED | OUTPATIENT
Start: 2023-09-18 | End: 2023-09-18

## 2023-09-18 RX ADMIN — MIDAZOLAM HYDROCHLORIDE 4 MILLIGRAM(S): 1 INJECTION, SOLUTION INTRAMUSCULAR; INTRAVENOUS at 22:44

## 2023-09-18 RX ADMIN — SODIUM CHLORIDE 2200 MILLILITER(S): 9 INJECTION INTRAMUSCULAR; INTRAVENOUS; SUBCUTANEOUS at 23:31

## 2023-09-18 RX ADMIN — KETAMINE HYDROCHLORIDE 150 MILLIGRAM(S): 100 INJECTION INTRAMUSCULAR; INTRAVENOUS at 22:38

## 2023-09-18 NOTE — ED ADULT TRIAGE NOTE - CHIEF COMPLAINT QUOTE
ems reports patient mother called, stating he missed some doses of zyprexz, patient with psychotic feaures of rats crawling on him and he has rabies, arrives awake andalert mother Corin [phone]

## 2023-09-18 NOTE — ED ADULT NURSE REASSESSMENT NOTE - NS ED NURSE REASSESS COMMENT FT1
patient more calm after medicaitons, mother at bedside, labs obtained, attempted to use urinal on arrival without success, straight cath performed as per direction of MD for fever, r/o sepsis workup with psychosis, report given to green 1 RN,

## 2023-09-18 NOTE — ED ADULT NURSE REASSESSMENT NOTE - NS ED NURSE REASSESS COMMENT FT1
patient evaluated by physician on arrival to Emergency Department, procedure for blood work explained, while placing tourniquet on patient, he bacme agitated and restless stating we gave him something, for staff safety with psychosis, patient required medicaitons to assist with care for fever and psych workup,

## 2023-09-19 VITALS
HEART RATE: 65 BPM | TEMPERATURE: 98 F | RESPIRATION RATE: 18 BRPM | DIASTOLIC BLOOD PRESSURE: 87 MMHG | SYSTOLIC BLOOD PRESSURE: 134 MMHG | OXYGEN SATURATION: 98 %

## 2023-09-19 DIAGNOSIS — F29 UNSPECIFIED PSYCHOSIS NOT DUE TO A SUBSTANCE OR KNOWN PHYSIOLOGICAL CONDITION: ICD-10-CM

## 2023-09-19 DIAGNOSIS — F20.9 SCHIZOPHRENIA, UNSPECIFIED: ICD-10-CM

## 2023-09-19 LAB
ANION GAP SERPL CALC-SCNC: 12 MMOL/L — SIGNIFICANT CHANGE UP (ref 5–17)
BASE EXCESS BLDV CALC-SCNC: -3 MMOL/L — LOW (ref -2–3)
BUN SERPL-MCNC: 11 MG/DL — SIGNIFICANT CHANGE UP (ref 8–20)
CA-I SERPL-SCNC: 1.13 MMOL/L — LOW (ref 1.15–1.33)
CALCIUM SERPL-MCNC: 8.3 MG/DL — LOW (ref 8.4–10.5)
CHLORIDE BLDV-SCNC: 108 MMOL/L — SIGNIFICANT CHANGE UP (ref 96–108)
CHLORIDE SERPL-SCNC: 105 MMOL/L — SIGNIFICANT CHANGE UP (ref 96–108)
CO2 SERPL-SCNC: 22 MMOL/L — SIGNIFICANT CHANGE UP (ref 22–29)
CREAT SERPL-MCNC: 0.85 MG/DL — SIGNIFICANT CHANGE UP (ref 0.5–1.3)
EGFR: 127 ML/MIN/1.73M2 — SIGNIFICANT CHANGE UP
GAS PNL BLDV: 138 MMOL/L — SIGNIFICANT CHANGE UP (ref 136–145)
GAS PNL BLDV: SIGNIFICANT CHANGE UP
GLUCOSE BLDV-MCNC: 93 MG/DL — SIGNIFICANT CHANGE UP (ref 70–99)
GLUCOSE SERPL-MCNC: 94 MG/DL — SIGNIFICANT CHANGE UP (ref 70–99)
HCO3 BLDV-SCNC: 23 MMOL/L — SIGNIFICANT CHANGE UP (ref 22–29)
HCT VFR BLDA CALC: 39 % — SIGNIFICANT CHANGE UP
HGB BLD CALC-MCNC: 12.9 G/DL — SIGNIFICANT CHANGE UP (ref 12.6–17.4)
HIV 1 & 2 AB SERPL IA.RAPID: SIGNIFICANT CHANGE UP
LACTATE BLDV-MCNC: 1.1 MMOL/L — SIGNIFICANT CHANGE UP (ref 0.5–2)
PCO2 BLDV: 41 MMHG — LOW (ref 42–55)
PH BLDV: 7.35 — SIGNIFICANT CHANGE UP (ref 7.32–7.43)
PO2 BLDV: 153 MMHG — HIGH (ref 25–45)
POTASSIUM BLDV-SCNC: 4.3 MMOL/L — SIGNIFICANT CHANGE UP (ref 3.5–5.1)
POTASSIUM SERPL-MCNC: 4.3 MMOL/L — SIGNIFICANT CHANGE UP (ref 3.5–5.3)
POTASSIUM SERPL-SCNC: 4.3 MMOL/L — SIGNIFICANT CHANGE UP (ref 3.5–5.3)
RAPID RVP RESULT: SIGNIFICANT CHANGE UP
SAO2 % BLDV: 99.9 % — SIGNIFICANT CHANGE UP
SARS-COV-2 RNA SPEC QL NAA+PROBE: SIGNIFICANT CHANGE UP
SODIUM SERPL-SCNC: 139 MMOL/L — SIGNIFICANT CHANGE UP (ref 135–145)

## 2023-09-19 PROCEDURE — 84132 ASSAY OF SERUM POTASSIUM: CPT

## 2023-09-19 PROCEDURE — 87086 URINE CULTURE/COLONY COUNT: CPT

## 2023-09-19 PROCEDURE — 82947 ASSAY GLUCOSE BLOOD QUANT: CPT

## 2023-09-19 PROCEDURE — 0225U NFCT DS DNA&RNA 21 SARSCOV2: CPT

## 2023-09-19 PROCEDURE — 80307 DRUG TEST PRSMV CHEM ANLYZR: CPT

## 2023-09-19 PROCEDURE — G0378: CPT

## 2023-09-19 PROCEDURE — T1013: CPT

## 2023-09-19 PROCEDURE — 85014 HEMATOCRIT: CPT

## 2023-09-19 PROCEDURE — 87040 BLOOD CULTURE FOR BACTERIA: CPT

## 2023-09-19 PROCEDURE — 96372 THER/PROPH/DIAG INJ SC/IM: CPT | Mod: XU

## 2023-09-19 PROCEDURE — 85730 THROMBOPLASTIN TIME PARTIAL: CPT

## 2023-09-19 PROCEDURE — 99236 HOSP IP/OBS SAME DATE HI 85: CPT

## 2023-09-19 PROCEDURE — 96376 TX/PRO/DX INJ SAME DRUG ADON: CPT

## 2023-09-19 PROCEDURE — 84295 ASSAY OF SERUM SODIUM: CPT

## 2023-09-19 PROCEDURE — 96374 THER/PROPH/DIAG INJ IV PUSH: CPT

## 2023-09-19 PROCEDURE — 82435 ASSAY OF BLOOD CHLORIDE: CPT

## 2023-09-19 PROCEDURE — 85610 PROTHROMBIN TIME: CPT

## 2023-09-19 PROCEDURE — 82803 BLOOD GASES ANY COMBINATION: CPT

## 2023-09-19 PROCEDURE — 85018 HEMOGLOBIN: CPT

## 2023-09-19 PROCEDURE — 36415 COLL VENOUS BLD VENIPUNCTURE: CPT

## 2023-09-19 PROCEDURE — 83605 ASSAY OF LACTIC ACID: CPT

## 2023-09-19 PROCEDURE — 81001 URINALYSIS AUTO W/SCOPE: CPT

## 2023-09-19 PROCEDURE — 99285 EMERGENCY DEPT VISIT HI MDM: CPT | Mod: 25

## 2023-09-19 PROCEDURE — 80053 COMPREHEN METABOLIC PANEL: CPT

## 2023-09-19 PROCEDURE — 71045 X-RAY EXAM CHEST 1 VIEW: CPT

## 2023-09-19 PROCEDURE — 99285 EMERGENCY DEPT VISIT HI MDM: CPT

## 2023-09-19 PROCEDURE — 82330 ASSAY OF CALCIUM: CPT

## 2023-09-19 PROCEDURE — 86703 HIV-1/HIV-2 1 RESULT ANTBDY: CPT

## 2023-09-19 PROCEDURE — 85025 COMPLETE CBC W/AUTO DIFF WBC: CPT

## 2023-09-19 PROCEDURE — 80048 BASIC METABOLIC PNL TOTAL CA: CPT

## 2023-09-19 PROCEDURE — 93005 ELECTROCARDIOGRAM TRACING: CPT

## 2023-09-19 RX ORDER — OLANZAPINE 15 MG/1
10 TABLET, FILM COATED ORAL AT BEDTIME
Refills: 0 | Status: DISCONTINUED | OUTPATIENT
Start: 2023-09-19 | End: 2023-09-26

## 2023-09-19 RX ORDER — OLANZAPINE 15 MG/1
10 TABLET, FILM COATED ORAL EVERY 12 HOURS
Refills: 0 | Status: DISCONTINUED | OUTPATIENT
Start: 2023-09-19 | End: 2023-09-26

## 2023-09-19 RX ADMIN — Medication 40 MILLIEQUIVALENT(S): at 00:40

## 2023-09-19 RX ADMIN — Medication 100 MILLIEQUIVALENT(S): at 01:42

## 2023-09-19 RX ADMIN — Medication 100 MILLIEQUIVALENT(S): at 02:49

## 2023-09-19 RX ADMIN — Medication 100 MILLIEQUIVALENT(S): at 00:41

## 2023-09-19 NOTE — ED ADULT NURSE NOTE - HPI (INCLUDE ILLNESS QUALITY, SEVERITY, DURATION, TIMING, CONTEXT, MODIFYING FACTORS, ASSOCIATED SIGNS AND SYMPTOMS)
Patient to  escorted by nurse. Patient alert and oriented dressed in yellow gown was medically cleared by main ED attending. Patient report diagnosis of schizophrenia, but patient report he gets psychotic after smoking. Patient was brought in by EMS activated by mother. Patient report non compliance with medication. Patient is going to be transferred to [facility] for in patient treatment.

## 2023-09-19 NOTE — ED ADULT NURSE NOTE - NSSEPSISCRITERIAMET_ED_A_ED
"[facility]  Av Clark MD  Primary Care Provider: Marco Gonzalez DO    Current Legal Status: [facility]    [facility] Brandon ER, a small town in [facility] [phone]) [phone]  [facility]     Abnormal VS & WBC/Abnormal Lactate

## 2023-09-19 NOTE — ED ADULT NURSE NOTE - PAIN RATING/NUMBER SCALE (0-10): ACTIVITY
Spoke with patient. She is aware of these lab results. Patient states she will call back when she is done with therapy and will schedule the ablation at that time. Patient states understanding. No further questions.   0 (no pain/absence of nonverbal indicators of pain)

## 2023-09-19 NOTE — ED BEHAVIORAL HEALTH ASSESSMENT NOTE - HPI (INCLUDE ILLNESS QUALITY, SEVERITY, DURATION, TIMING, CONTEXT, MODIFYING FACTORS, ASSOCIATED SIGNS AND SYMPTOMS)
21 y/o man; unemployed, worked at [employer] in summer; domiciled with mother and sister; HS graduate; hx of schizophrenia; previously under care of psychiatrist (pt unable to recall name), last seen in June 2023; noncompliant with psych medications; BIB EMS initiated by mother after seeing rats while walking home and feeling them biting him.   Pt is irritable with poor eye contact and short responses to questions asked responding "I don't know" to most. He states that yesterday he had used a stranger's wax pen on the subway and then began to see rats 30 minutes later when walking in [place]. He had called his mother who then called EMS. Pt states that he "has a lot of regrets....made a food out of himself in front of a lot of people" yesterday. Denies taking any other substances recently. He states that he previously had a psychiatrist Dr. Jang(?) who he stopped going to in June because he felt as if he did not need her anymore. Pt was dx'd with schizophrenia and given olanzapine which he has been noncompliant with. Last dose was taken 2 days ago, states he had not taken the medication for a while before that. Pt admits to having auditory hallucinations (unable to elaborate), denies SI/HI, states he has past trauma without elaborating.     Collateral info  Mother, Adrianne, [phone]   Attempted to contact, left vm 23 y/o man; unemployed, worked at [employer] in summer; domiciled with mother and sister; HS graduate; hx of schizophrenia; previously under care of psychiatrist (pt unable to recall name), last seen in June 2023; noncompliant with psych medications; no psychiatric family hx as per chart; BIB EMS initiated by mother after seeing rats while walking home and feeling them biting him.   Pt is irritable with poor eye contact and short responses to questions asked responding "I don't know" to most. He states that yesterday he had used a stranger's wax pen on the subway and then began to see rats 30 minutes later when walking in [place]. He had called his mother who then called EMS. Pt states that he "has a lot of regrets....made a food out of himself in front of a lot of people" yesterday. Denies taking any other substances recently. He states that he previously had a psychiatrist Dr. Jang(?) who he stopped going to in June because he felt as if he did not need her anymore. Pt was dx'd with schizophrenia and given olanzapine which he has been noncompliant with. Last dose was taken 2 days ago, states he had not taken the medication for a while before that. Pt admits to having auditory hallucinations (unable to elaborate), denies SI/HI, states he has past trauma without elaborating.     Collateral info  Mother, Adrianne, [phone]   Attempted to contact, left vm 23 y/o man; unemployed, worked at [employer] in summer; domiciled with mother and sister; HS graduate; hx of schizophrenia; previously under care of psychiatrist (pt unable to recall name), last seen in June 2023; noncompliant with psych medications; no psychiatric family hx as per chart; BIB EMS initiated by mother after seeing rats while walking home and feeling them biting him.   Pt is irritable with poor eye contact and short responses to questions asked responding "I don't know" to most. He states that yesterday he had used a stranger's wax pen on the subway and then began to see rats 30 minutes later when walking in [place]. He had called his mother who then called EMS. Pt states that he "has a lot of regrets....made a food out of himself in front of a lot of people" yesterday. Denies taking any other substances recently. He states that he previously had a psychiatrist Dr. Jang(?) who he stopped going to in June because he felt as if he did not need her anymore. Pt was dx'd with schizophrenia and given olanzapine which he has been noncompliant with. Last dose was taken 2 days ago, states he had not taken the medication for a while before that. Pt admits to having auditory hallucinations (unable to elaborate), denies SI/HI, states he has past trauma without elaborating.     Collateral info  Mother, Adrianne, [phone]   Mother states that pt has been "screaming, angry, all over the place, talking to himself, not sleeping". Pt states that yesterday pt went to the gym and expressed to his mom that he wanted to be picked up but was giving the wrong directions because he was confused. States that she had called a crisis line 1 month ago because pt was "breaking doors, making holes in the wall due to anger". Pt is noncompliant with medications and is prescribed currently to take fluoxetine 10mg once a day and olanzapine 5mg once a day, prescribed by psychiatrist from [facility] in [place] after crisis line called. Mother wants him to be admitted.

## 2023-09-19 NOTE — ED ADULT NURSE NOTE - OBJECTIVE STATEMENT
received report from VELVET Medina. pt has hx of schizophrenia, off his meds. pt irritable and noncompliant. pt restless, endorses drug use. pt breathing even and unlabored at this time. pt in yellow gown, 1:1 at bedside. pt mother at bedside.

## 2023-09-19 NOTE — ED BEHAVIORAL HEALTH ASSESSMENT NOTE - PSYCHIATRIC ISSUES AND PLAN (INCLUDE STANDING AND PRN MEDICATION)
schizophrenia, continue standing medication olanzapine schizophrenia, continue standing medication fluoxetine 10mg once a day and olanzapine 5mg once a day

## 2023-09-19 NOTE — ED PROVIDER NOTE - CLINICAL SUMMARY MEDICAL DECISION MAKING FREE TEXT BOX
Patient with worsening psychiatric condition the past several weeks. Patient with improved vitals. Labs with mild electrolyte abnormalities; potassium to be replenished, VBG improved. Will continue to observe but fever likely viral in nature. Patient to undergo psychiatric consultation.

## 2023-09-19 NOTE — ED BEHAVIORAL HEALTH ASSESSMENT NOTE - OTHER PAST PSYCHIATRIC HISTORY (INCLUDE DETAILS REGARDING ONSET, COURSE OF ILLNESS, INPATIENT/OUTPATIENT TREATMENT)
schizophrenia dx'd by psychiatrist (Dr. Jang?) last seen June 2023   noncompliant with olanzapine schizophrenia dx'd by psychiatrist (Dr. Jang?) last seen June 2023  noncompliant fluoxetine 10mg once a day and olanzapine 5mg once a day rx'd by [facility] in [place] psychiatrist

## 2023-09-19 NOTE — CHART NOTE - NSCHARTNOTEFT_GEN_A_CORE
SWNote: all needed paperwork sent to [facility] as requested. P/c from [facility] accepted by Dr Gilliland 9.27 status. [facility] will be called for mae . NEAF /billing letter left at RN desk . Transfer needs auth ,SW to follow asap . SWNote: all needed paperwork sent to [facility] as requested. P/c from kati Edwards accepted by Dr Gilliland 9.27 status. [facility] will be called for mae . NEAF /billing letter left at behavioral RN desk . Transfer needs auth ,SW to follow asap .

## 2023-09-19 NOTE — ED BEHAVIORAL HEALTH ASSESSMENT NOTE - SUMMARY
21 yo  man PPHx schizophrenia, noncompliant with medications, BIB EMS by mother after pt called her stating he saw rats while walking at night and feeling them biting him. Pt has past visit to ED for auditory hallucinations. He endorses that he saw rats yesterday and felt as if they had bit him, but he is unsure. Currently irritable and poor historian, unable to elaborate on most questions asked. Mother attempted to contact and left VM. Pt admits to having auditory hallucinations, denies SI/HI, states he has past trauma.   Pending Utox, pt is noncompliant with medications and is a danger to himself. Would benefit from inpatient psych. 21 yo  man PPHx schizophrenia, noncompliant with medications, BIB EMS by mother after pt called her stating he saw rats while walking at night and feeling them biting him. Pt has past visit to ED for auditory hallucinations. He endorses that he saw rats yesterday and felt as if they had bit him, but he is unsure. Currently irritable and poor historian, unable to elaborate on most questions asked. Mother attempted to contact and left VM. Pt admits to having auditory hallucinations, denies SI/HI, states he has past trauma.   Pending Utox, pt is noncompliant with medications and is a danger to himself and possibly others due to psychosis. Would benefit from inpatient psych. Await medical clearance and bed availability. 21 yo  man PPHx schizophrenia, noncompliant with medications, BIB EMS by mother after pt called her stating he saw rats while walking at night and feeling them biting him. Pt has past visit to ED for auditory hallucinations. He endorses that he saw rats yesterday and felt as if they had bit him, but he is unsure. Currently irritable and poor historian, unable to elaborate on most questions asked. Mother states that pt is aggressive and angry at home, and believes it is best to have him admitted. Pt admits to having auditory hallucinations, denies SI/HI, states he has past trauma.   Pending Utox, pt is noncompliant with medications and is a danger to himself and possibly others due to psychosis. Would benefit from inpatient psych. Await medical clearance and bed availability.

## 2023-09-19 NOTE — ED BEHAVIORAL HEALTH ASSESSMENT NOTE - RISK ASSESSMENT
RF incomplete history, prior psychiatric history, h/o substance use, agitation.  PF supportive mother RF incomplete history, prior psychiatric history, h/o substance use, h/o agitation.  PF supportive mother

## 2023-09-19 NOTE — ED BEHAVIORAL HEALTH ASSESSMENT NOTE - DETAILS
none known pt says he has a hx of trauma without elaborating tbd attempted to call mother , left vm mother agrees with inpatient

## 2023-09-19 NOTE — ED ADULT NURSE REASSESSMENT NOTE - NS ED NURSE REASSESS COMMENT FT1
Patient to  escorted by nurse. Patient alert and oriented dressed in yellow gown was medically cleared by main ED attending. Patient report diagnosis of schizophrenia, but patient report he gets psychotic after smoking. Patient was brought in by EMS activated by mother. Patient report non compliance with medication. Patient is going to be transferred to [facility] for in patient treatment.

## 2023-09-19 NOTE — ED ADULT NURSE NOTE - NSFALLHARMRISKINTERV_ED_ALL_ED
Assistance OOB with selected safe patient handling equipment if applicable/Communicate risk of Fall with Harm to all staff, patient, and family/Encourage patient to sit up slowly, dangle for a short time, stand at bedside before walking/Orthostatic vital signs/Provide visual cue: red socks, yellow wristband, yellow gown, etc/Reinforce activity limits and safety measures with patient and family/Review medications for side effects contributing to fall risk/Toileting schedule using arm’s reach rule for commode and bathroom/Bed in lowest position, wheels locked, appropriate side rails in place/Call bell, personal items and telephone in reach/Instruct patient to call for assistance before getting out of bed/chair/stretcher/Non-slip footwear applied when patient is off stretcher/Enterprise to call system/Physically safe environment - no spills, clutter or unnecessary equipment/Purposeful Proactive Rounding/Room/bathroom lighting operational, light cord in reach

## 2023-09-19 NOTE — ED CDU PROVIDER DISPOSITION NOTE - CLINICAL COURSE
22-year-old male decompensated schizophrenic, medically cleared,  involuntary psychiatric hospitalization.

## 2023-09-19 NOTE — ED BEHAVIORAL HEALTH ASSESSMENT NOTE - DESCRIPTION
Vital Signs Last 24 Hrs  T(C): 36.8 (19 Sep 2023 05:00), Max: 37.9 (18 Sep 2023 22:13)  T(F): 98.2 (19 Sep 2023 05:00), Max: 100.3 (18 Sep 2023 22:13)  HR: 68 (19 Sep 2023 07:26) (68 - 164)  BP: 137/98 (19 Sep 2023 07:26) (121/78 - 183/86)  BP(mean): --  ABP: --  ABP(mean): --  RR: 20 (19 Sep 2023 07:26) (16 - 24)  SpO2: 95% (19 Sep 2023 07:26) (95% - 99%)    O2 Parameters below as of 19 Sep 2023 07:26  Patient On (Oxygen Delivery Method): room air  ==============================================  pending utox results, WBC count 16.4 Schizophrenia HS grad, [place], unemployed

## 2023-09-19 NOTE — ED PROVIDER NOTE - CARE PLAN
1 Principal Discharge DX:	Psychosis  Secondary Diagnosis:	Acute viral syndrome  Secondary Diagnosis:	Acute hypokalemia

## 2023-09-19 NOTE — ED ADULT NURSE NOTE - CHIEF COMPLAINT QUOTE
ems reports patient mother called, stating he missed some doses of zyprexz, patient with psychotic feaures of rats crawling on him and he has rabies, arrives awake andalert mother Corin [phone]

## 2023-09-19 NOTE — ED ADULT NURSE REASSESSMENT NOTE - NS ED NURSE REASSESS COMMENT FT1
Report given to  RN Adriana: pt calm and cooperative at this time. Resp. equal and unlabored b/l. VSS. NAD. Comfort measures provided, safety measures implemented, call bell in reach. Pt. brought back to , Security called and states they will meet back at  area.

## 2023-09-19 NOTE — ED CDU PROVIDER INITIAL DAY NOTE - CLINICAL SUMMARY MEDICAL DECISION MAKING FREE TEXT BOX
Patient with worsening psychiatric condition the past several weeks. Patient with improved vitals. Labs with mild electrolyte abnormalities; potassium to be replenished, VBG improved. Will continue to observe but fever likely viral in nature. Patient to undergo psychiatric consultation for concerns of worsening psychosis.

## 2023-09-19 NOTE — ED PROVIDER NOTE - PROGRESS NOTE DETAILS
Tachycardia has improved but patient somewhat somnolent.  MOther justyn at the beside. States he shoud flouoxetine 10 mg and olanzapine 5 mg daily but takes it spordic. Admitted to [facility] two years ago. Has not been sleeping the past couple of weeks Has episodes of confusion, yelling, restlessness, and speaking to himself. Tonight after leaving the gym patient called mother for hel and she found him wondering the street. Confused. Patient endorsed that he did use marijuana  tonight after leaving the gym. Patient noted to be acting bizarrely in the ED. After initial evaluation patient informed that we would need to place an IV and obtain labs. Tourniquet placed and skin swabbed with alcohol pad. Patient noted to because very tense and then irate stating that "we" put something in his body. When asked about the sudden change in his demeanor patient continued to act strange, appeared tense with clenching fist and steadily getting upset stating we had just done something to him. Medication ordered for sedation due to concerns of potential violence. Significant tachycardia noted and versed ordered with improved symptoms. Tachycardia has improved but patient somewhat somnolent.  Mother Corin at the beside. States he should be on fluoxetine 10 mg and olanzapine 5 mg daily but takes it sporadically. He also follows with a therapist sporadically, but recently it has been a long stretch. He was admitted to [facility] two years ago. Mother states that he has not been sleeping the past couple of weeks. He has repeated episodes of confusion, yelling, restlessness, and speaking to himself. She feels it has gotten worsen the past couple of weeks since he has not been sleeping. Tonight after leaving the gym patient called mother for a pick-up. She found him wondering the street and appeared somewhat confused and acting abnormal. Patient endorses that he did use marijuana  tonight after leaving the gym and hallucinations started afterwards. Mother states when he patient got into her car he seemed to be having a panic attack and EMS contacted. Mother states that the patient also has episodes of explosive anger which has interfered with jobs and personal relationships repeatedly. She wants him to undergo psychiatric evaluation since she is concerned that he is slowly decompensating. Labs improved and patient stable. Will undergo psychiatric consultation this morning for possible worsening psychosis.

## 2023-09-19 NOTE — ED BEHAVIORAL HEALTH ASSESSMENT NOTE - NSBHATTESTCOMMENTATTENDFT_PSY_A_CORE
patient seen is paranoid delusional admits to "psychosis from schizophrenia" will place on 9.27 appilcation patient seen is paranoid delusional admits to "psychosis from schizophrenia" will place on 9.27 application

## 2023-09-19 NOTE — ED PROVIDER NOTE - OBJECTIVE STATEMENT
23 yo male presents for evaluation on not feeling well. Patient states he left the gym and while walking he suddenly felt and saw rats climbing all over his body. He states he does not know how they got there and felt like they were real.

## 2023-09-20 LAB
CULTURE RESULTS: SIGNIFICANT CHANGE UP
SPECIMEN SOURCE: SIGNIFICANT CHANGE UP

## 2023-09-21 DIAGNOSIS — E87.6 HYPOKALEMIA: ICD-10-CM

## 2023-09-21 DIAGNOSIS — R41.82 ALTERED MENTAL STATUS, UNSPECIFIED: ICD-10-CM

## 2023-09-21 DIAGNOSIS — T43.596A UNDERDOSING OF OTHER ANTIPSYCHOTICS AND NEUROLEPTICS, INITIAL ENCOUNTER: ICD-10-CM

## 2023-09-21 DIAGNOSIS — F20.9 SCHIZOPHRENIA, UNSPECIFIED: ICD-10-CM

## 2023-09-21 DIAGNOSIS — T43.226A UNDERDOSING OF SELECTIVE SEROTONIN REUPTAKE INHIBITORS, INITIAL ENCOUNTER: ICD-10-CM

## 2023-09-21 DIAGNOSIS — Z91.128 PATIENT'S INTENTIONAL UNDERDOSING OF MEDICATION REGIMEN FOR OTHER REASON: ICD-10-CM

## 2023-09-21 DIAGNOSIS — Z20.822 CONTACT WITH AND (SUSPECTED) EXPOSURE TO COVID-19: ICD-10-CM

## 2023-09-21 DIAGNOSIS — B34.9 VIRAL INFECTION, UNSPECIFIED: ICD-10-CM

## 2023-09-21 DIAGNOSIS — F29 UNSPECIFIED PSYCHOSIS NOT DUE TO A SUBSTANCE OR KNOWN PHYSIOLOGICAL CONDITION: ICD-10-CM

## 2023-09-21 DIAGNOSIS — F12.90 CANNABIS USE, UNSPECIFIED, UNCOMPLICATED: ICD-10-CM

## 2023-09-23 LAB
DRUG SCREEN, SERUM: ABNORMAL

## 2023-09-24 LAB
CULTURE RESULTS: SIGNIFICANT CHANGE UP
SPECIMEN SOURCE: SIGNIFICANT CHANGE UP

## 2023-11-09 NOTE — ED ADULT NURSE NOTE - SEPSIS REFERENCE DATA FOR CRITERIA 1 WBC
date evidenced by improved TUG and Tinetti scores and safe and ind functional mobility. Pt has received maximum benefit from home care PT and has achieved most of her PT goals. P: DC PT and pt agreed. Pt was instructed to continue with HEP and ambulation. F/u visit with PCP Dr. Fermin Bey is in 2 months.   Informed Dr. Fermin Bey  and home care team via WriteOn regarding DC from home care PT.
Abnormal WBC: < 4,000 OR > 12,000

## 2024-06-01 NOTE — ED BEHAVIORAL HEALTH ASSESSMENT NOTE - SELF INJURIOUS BEHAVIOR WITHOUT SUICIDAL INTENT:
Called [facility] [phone]  says no nursing available until Monday, transferred me to Henny VALENTIN [phone] to leave a message.  VM left.    None known

## 2024-09-17 NOTE — ED ADULT NURSE NOTE - SEPSIS REFERENCE DATA FOR CRITERIA 1 WBC
Health Maintenance       DM/CKD Microalbumin Ratio (Yearly)  Never done    Hepatitis B Vaccine (For Physician/APC Discussion) (11 of 11 - Risk Dialysis 4-dose series)  Overdue since 7/13/2023    Diabetes Eye Exam (Yearly)  Overdue since 1/5/2024    COVID-19 Vaccine (5 - 2023-24 season)  Overdue since 9/1/2024    Traditional Medicare- Medicare Wellness Visit (Yearly)  Due since 9/1/2024    Influenza Vaccine (1)  Due since 9/1/2024    Diabetes Foot Exam (Yearly)  Due since 9/12/2024    Depression Screening (Yearly)  Due soon on 10/30/2024           Following review of the above:  Patient is not proceeding with: Diabetes Eye Exam, DM/CKD Microalbumin, COVID-19, Hep B, and Influenza    Note: Refer to final orders and clinician documentation.       Abnormal WBC: < 4,000 OR > 12,000

## 2024-12-17 ENCOUNTER — EMERGENCY (EMERGENCY)
Facility: HOSPITAL | Age: 24
LOS: 1 days | Discharge: OTHER INSTITUTION | End: 2024-12-17
Attending: STUDENT IN AN ORGANIZED HEALTH CARE EDUCATION/TRAINING PROGRAM
Payer: SELF-PAY

## 2024-12-17 VITALS
DIASTOLIC BLOOD PRESSURE: 73 MMHG | HEART RATE: 86 BPM | WEIGHT: 149.91 LBS | SYSTOLIC BLOOD PRESSURE: 143 MMHG | RESPIRATION RATE: 16 BRPM | TEMPERATURE: 98 F | HEIGHT: 70 IN | OXYGEN SATURATION: 100 %

## 2024-12-17 DIAGNOSIS — F20.9 SCHIZOPHRENIA, UNSPECIFIED: ICD-10-CM

## 2024-12-17 LAB
ALBUMIN SERPL ELPH-MCNC: 4.5 G/DL — SIGNIFICANT CHANGE UP (ref 3.3–5.2)
ALP SERPL-CCNC: 97 U/L — SIGNIFICANT CHANGE UP (ref 40–120)
ALT FLD-CCNC: 15 U/L — SIGNIFICANT CHANGE UP
AMPHET UR-MCNC: NEGATIVE — SIGNIFICANT CHANGE UP
ANION GAP SERPL CALC-SCNC: 12 MMOL/L — SIGNIFICANT CHANGE UP (ref 5–17)
APAP SERPL-MCNC: <3 UG/ML — LOW (ref 10–26)
APPEARANCE UR: CLEAR — SIGNIFICANT CHANGE UP
AST SERPL-CCNC: 20 U/L — SIGNIFICANT CHANGE UP
BACTERIA # UR AUTO: NEGATIVE /HPF — SIGNIFICANT CHANGE UP
BARBITURATES UR SCN-MCNC: NEGATIVE — SIGNIFICANT CHANGE UP
BASOPHILS # BLD AUTO: 0.03 K/UL — SIGNIFICANT CHANGE UP (ref 0–0.2)
BASOPHILS NFR BLD AUTO: 0.4 % — SIGNIFICANT CHANGE UP (ref 0–2)
BENZODIAZ UR-MCNC: NEGATIVE — SIGNIFICANT CHANGE UP
BILIRUB SERPL-MCNC: 0.6 MG/DL — SIGNIFICANT CHANGE UP (ref 0.4–2)
BILIRUB UR-MCNC: NEGATIVE — SIGNIFICANT CHANGE UP
BUN SERPL-MCNC: 11.3 MG/DL — SIGNIFICANT CHANGE UP (ref 8–20)
CALCIUM SERPL-MCNC: 9.5 MG/DL — SIGNIFICANT CHANGE UP (ref 8.4–10.5)
CAST: 0 /LPF — SIGNIFICANT CHANGE UP (ref 0–4)
CHLORIDE SERPL-SCNC: 102 MMOL/L — SIGNIFICANT CHANGE UP (ref 96–108)
CO2 SERPL-SCNC: 25 MMOL/L — SIGNIFICANT CHANGE UP (ref 22–29)
COCAINE METAB.OTHER UR-MCNC: NEGATIVE — SIGNIFICANT CHANGE UP
COLOR SPEC: YELLOW — SIGNIFICANT CHANGE UP
CREAT SERPL-MCNC: 0.75 MG/DL — SIGNIFICANT CHANGE UP (ref 0.5–1.3)
DIFF PNL FLD: NEGATIVE — SIGNIFICANT CHANGE UP
EGFR: 130 ML/MIN/1.73M2 — SIGNIFICANT CHANGE UP
EOSINOPHIL # BLD AUTO: 0.02 K/UL — SIGNIFICANT CHANGE UP (ref 0–0.5)
EOSINOPHIL NFR BLD AUTO: 0.3 % — SIGNIFICANT CHANGE UP (ref 0–6)
ETHANOL SERPL-MCNC: <10 MG/DL — SIGNIFICANT CHANGE UP (ref 0–9)
FENTANYL UR QL SCN: NEGATIVE — SIGNIFICANT CHANGE UP
FLUAV AG NPH QL: SIGNIFICANT CHANGE UP
FLUBV AG NPH QL: SIGNIFICANT CHANGE UP
GLUCOSE SERPL-MCNC: 96 MG/DL — SIGNIFICANT CHANGE UP (ref 70–99)
GLUCOSE UR QL: NEGATIVE MG/DL — SIGNIFICANT CHANGE UP
HCT VFR BLD CALC: 40.2 % — SIGNIFICANT CHANGE UP (ref 39–50)
HGB BLD-MCNC: 13.5 G/DL — SIGNIFICANT CHANGE UP (ref 13–17)
IMM GRANULOCYTES NFR BLD AUTO: 0.4 % — SIGNIFICANT CHANGE UP (ref 0–0.9)
KETONES UR-MCNC: NEGATIVE MG/DL — SIGNIFICANT CHANGE UP
LEUKOCYTE ESTERASE UR-ACNC: NEGATIVE — SIGNIFICANT CHANGE UP
LYMPHOCYTES # BLD AUTO: 1.64 K/UL — SIGNIFICANT CHANGE UP (ref 1–3.3)
LYMPHOCYTES # BLD AUTO: 21.8 % — SIGNIFICANT CHANGE UP (ref 13–44)
MCHC RBC-ENTMCNC: 29.9 PG — SIGNIFICANT CHANGE UP (ref 27–34)
MCHC RBC-ENTMCNC: 33.6 G/DL — SIGNIFICANT CHANGE UP (ref 32–36)
MCV RBC AUTO: 89.1 FL — SIGNIFICANT CHANGE UP (ref 80–100)
METHADONE UR-MCNC: NEGATIVE — SIGNIFICANT CHANGE UP
MONOCYTES # BLD AUTO: 0.46 K/UL — SIGNIFICANT CHANGE UP (ref 0–0.9)
MONOCYTES NFR BLD AUTO: 6.1 % — SIGNIFICANT CHANGE UP (ref 2–14)
NEUTROPHILS # BLD AUTO: 5.33 K/UL — SIGNIFICANT CHANGE UP (ref 1.8–7.4)
NEUTROPHILS NFR BLD AUTO: 71 % — SIGNIFICANT CHANGE UP (ref 43–77)
NITRITE UR-MCNC: NEGATIVE — SIGNIFICANT CHANGE UP
OPIATES UR-MCNC: NEGATIVE — SIGNIFICANT CHANGE UP
PCP SPEC-MCNC: SIGNIFICANT CHANGE UP
PCP UR-MCNC: NEGATIVE — SIGNIFICANT CHANGE UP
PH UR: 6.5 — SIGNIFICANT CHANGE UP (ref 5–8)
PLATELET # BLD AUTO: 337 K/UL — SIGNIFICANT CHANGE UP (ref 150–400)
POTASSIUM SERPL-MCNC: 4.9 MMOL/L — SIGNIFICANT CHANGE UP (ref 3.5–5.3)
POTASSIUM SERPL-SCNC: 4.9 MMOL/L — SIGNIFICANT CHANGE UP (ref 3.5–5.3)
PROT SERPL-MCNC: 7.7 G/DL — SIGNIFICANT CHANGE UP (ref 6.6–8.7)
PROT UR-MCNC: NEGATIVE MG/DL — SIGNIFICANT CHANGE UP
RBC # BLD: 4.51 M/UL — SIGNIFICANT CHANGE UP (ref 4.2–5.8)
RBC # FLD: 12.9 % — SIGNIFICANT CHANGE UP (ref 10.3–14.5)
RBC CASTS # UR COMP ASSIST: 0 /HPF — SIGNIFICANT CHANGE UP (ref 0–4)
RSV RNA NPH QL NAA+NON-PROBE: SIGNIFICANT CHANGE UP
SALICYLATES SERPL-MCNC: <0.6 MG/DL — LOW (ref 10–20)
SARS-COV-2 RNA SPEC QL NAA+PROBE: SIGNIFICANT CHANGE UP
SODIUM SERPL-SCNC: 139 MMOL/L — SIGNIFICANT CHANGE UP (ref 135–145)
SP GR SPEC: <1.005 — LOW (ref 1–1.03)
SQUAMOUS # UR AUTO: 0 /HPF — SIGNIFICANT CHANGE UP (ref 0–5)
THC UR QL: POSITIVE
TSH SERPL-MCNC: 1.23 UIU/ML — SIGNIFICANT CHANGE UP (ref 0.27–4.2)
UROBILINOGEN FLD QL: 0.2 MG/DL — SIGNIFICANT CHANGE UP (ref 0.2–1)
WBC # BLD: 7.51 K/UL — SIGNIFICANT CHANGE UP (ref 3.8–10.5)
WBC # FLD AUTO: 7.51 K/UL — SIGNIFICANT CHANGE UP (ref 3.8–10.5)
WBC UR QL: 0 /HPF — SIGNIFICANT CHANGE UP (ref 0–5)

## 2024-12-17 PROCEDURE — 87637 SARSCOV2&INF A&B&RSV AMP PRB: CPT

## 2024-12-17 PROCEDURE — 93005 ELECTROCARDIOGRAM TRACING: CPT

## 2024-12-17 PROCEDURE — 80307 DRUG TEST PRSMV CHEM ANLYZR: CPT

## 2024-12-17 PROCEDURE — 99285 EMERGENCY DEPT VISIT HI MDM: CPT | Mod: 25

## 2024-12-17 PROCEDURE — 81001 URINALYSIS AUTO W/SCOPE: CPT

## 2024-12-17 PROCEDURE — 36415 COLL VENOUS BLD VENIPUNCTURE: CPT

## 2024-12-17 PROCEDURE — 80053 COMPREHEN METABOLIC PANEL: CPT

## 2024-12-17 PROCEDURE — 93010 ELECTROCARDIOGRAM REPORT: CPT

## 2024-12-17 PROCEDURE — 90792 PSYCH DIAG EVAL W/MED SRVCS: CPT

## 2024-12-17 PROCEDURE — 85025 COMPLETE CBC W/AUTO DIFF WBC: CPT

## 2024-12-17 PROCEDURE — 84443 ASSAY THYROID STIM HORMONE: CPT

## 2024-12-17 PROCEDURE — 99285 EMERGENCY DEPT VISIT HI MDM: CPT

## 2024-12-17 NOTE — ED PROVIDER NOTE - CLINICAL SUMMARY MEDICAL DECISION MAKING FREE TEXT BOX
Unclear why patient is in the ED as he missed his appointment with the psychiatrist.  Tried calling mother for collateral but was unable to reach patient is calm and cooperative in the ED will check labs and call psych for evaluation. Unclear why patient is in the ED as he missed his appointment with the psychiatrist.  Tried calling mother for collateral but was unable to reach patient is calm and cooperative in the ED will check labs and call psych for evaluation.    24-year-old seen evaluated by psychiatry.  Patient accepted for inpatient psychiatry admission at [facility]. Val Wise MD Attending Physician-

## 2024-12-17 NOTE — ED PROVIDER NOTE - PROGRESS NOTE DETAILS
Spoke to mother who states that the medication has not been altering his behavior and he has been aggressive.  And she is not willing to take him back to home.  Patient was assessed by psychiatry and recommended involuntary psychiatric admission.

## 2024-12-17 NOTE — ED PROVIDER NOTE - OBJECTIVE STATEMENT
24-year-old male with history of schizophrenia brought in by EMS after mother called the EMS to bring him to the ED.  Patient says that he went to his psychiatrist appointment for medication that he gets monthly but he was late for his appointment and he missed the appointment and was brought here.  Patient has no complaints but had an argument with his mom.  Patient is calm and cooperative and does not offer much history patient denies any hallucinations suicidal ideation or homicidal ideation denies use of alcohol or drugs.  Patient states that he gets monthly injections.

## 2024-12-17 NOTE — ED BEHAVIORAL HEALTH ASSESSMENT NOTE - HPI (INCLUDE ILLNESS QUALITY, SEVERITY, DURATION, TIMING, CONTEXT, MODIFYING FACTORS, ASSOCIATED SIGNS AND SYMPTOMS)
Patient is a 24 year old, male; domiciled with mother; single; noncaregiver; unemployed; past psychiatric history of schizophrenia; 2 previous psychiatric hospitalizations at [facility]; no known suicide attempts; known history of violence and aggression in the home; no arrests; recent cannabis use; no known history of complicated withdrawal; no PMH; brought in by EMS; called by mother; presenting with agitation; in the setting of refusing to attend his appt today with his psychiatrist at [facility].    Collateral from mother states that she was driving the pt to his psychiatrist appt today when the pt became acutely agitated stating he would not attend the appt and was refusing his injection. Mother states the pt began screaming and yelling and cursing in the car. Per mother, the pt's mood has become increasingly more irritable and aggressive lately. 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 Mother endorses the pt breaking doors and throwing things in the home and does not feel safe with the pt in the home. Mother expresses safety concerns and feels the pt may warrant a psychiatric admission at this time. Patient is a 24 year old, male; domiciled with mother; single; noncaregiver; unemployed; past psychiatric history of schizophrenia; 2 previous psychiatric hospitalizations at [facility]; no known suicide attempts; known history of violence and aggression in the home; no arrests; recent cannabis use; no known history of complicated withdrawal; no PMH; brought in by EMS; called by mother; presenting with agitation; in the setting of refusing to attend his appt today with his psychiatrist at [facility].    Pt presents with minimal insight into his recent behavior or remorse for his actions but denies any current SI/HI/AH/VH/paranoia, Collateral from mother states that she was driving the pt to his psychiatrist appt today when the pt became acutely agitated stating he would not attend the appt and was refusing his injection. Mother states the pt began screaming and yelling and cursing in the car. Per mother, the pt's mood has become increasingly more irritable and aggressive lately. 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 Mother endorses the pt breaking doors and throwing things in the home and does not feel safe with the pt in the home. Mother expresses safety concerns and feels the pt may warrant a psychiatric admission at this time.

## 2024-12-17 NOTE — ED ADULT TRIAGE NOTE - CHIEF COMPLAINT QUOTE
pt BIBA from psychiatrist office because mother wants blood work. pt states he receives monthly injections he did not get this month. pt denies si, hi, visual/auditory disturbances, drug or alcohol use

## 2024-12-17 NOTE — ED BEHAVIORAL HEALTH ASSESSMENT NOTE - SUMMARY
24 year old, male; domiciled with mother; single; noncaregiver; unemployed; past psychiatric history of schizophrenia; 2 previous psychiatric hospitalizations at [facility]; no known suicide attempts; known history of violence and aggression in the home; no arrests; recent cannabis use; no known history of complicated withdrawal; no PMH; brought in by EMS; called by mother; presenting with agitation; in the setting of refusing to attend his appt today with his psychiatrist at [facility].    Collateral from mother endorses recent aggression and safety concerns with the pt returning home and endorses the pt being appropriate for a higher level of care given his recent mood lability and aggression. 24 year old, male; domiciled with mother; single; noncaregiver; unemployed; past psychiatric history of schizophrenia; 2 previous psychiatric hospitalizations at [facility]; no known suicide attempts; known history of violence and aggression in the home; no arrests; recent cannabis use; no known history of complicated withdrawal; no PMH; brought in by EMS; called by mother; presenting with agitation; in the setting of refusing to attend his appt today with his psychiatrist at [facility].      Pt presents with minimal insight into his recent behavior or remorse for his actions but denies any current SI/HI/AH/VH/paranoia,  Collateral from mother endorses recent aggression and safety concerns with the pt returning home requesting a higher level of care given his recent mood lability and aggression. Pt will be held for involuntary transfer pending bed availability.

## 2024-12-17 NOTE — ED BEHAVIORAL HEALTH ASSESSMENT NOTE - DESCRIPTION
none Domiciled with mother; unemployed; HS graduate No acute events or stat meds.  ICU Vital Signs Last 24 Hrs  T(C): 36.8 (17 Dec 2024 14:42), Max: 36.8 (17 Dec 2024 14:42)  T(F): 98.3 (17 Dec 2024 14:42), Max: 98.3 (17 Dec 2024 14:42)  HR: 86 (17 Dec 2024 14:42) (86 - 86)  BP: 143/73 (17 Dec 2024 14:42) (143/73 - 143/73)  BP(mean): --  ABP: --  ABP(mean): --  RR: 16 (17 Dec 2024 14:42) (16 - 16)  SpO2: 100% (17 Dec 2024 14:42) (100% - 100%)    O2 Parameters below as of 17 Dec 2024 14:42  Patient On (Oxygen Delivery Method): room air

## 2024-12-17 NOTE — ED BEHAVIORAL HEALTH ASSESSMENT NOTE - NSBHATTESTTYPEVISIT_PSY_A_CORE
no chills/no decreased eating/drinking/no dizziness/no fever/no vomiting/no weakness/no nausea/no tingling
MANDI without on-site Attending supervision

## 2024-12-18 ENCOUNTER — INPATIENT (INPATIENT)
Facility: HOSPITAL | Age: 24
LOS: 19 days | Discharge: HOME | End: 2025-01-07
Attending: STUDENT IN AN ORGANIZED HEALTH CARE EDUCATION/TRAINING PROGRAM | Admitting: STUDENT IN AN ORGANIZED HEALTH CARE EDUCATION/TRAINING PROGRAM
Payer: COMMERCIAL

## 2024-12-18 VITALS
DIASTOLIC BLOOD PRESSURE: 54 MMHG | RESPIRATION RATE: 18 BRPM | HEART RATE: 72 BPM | OXYGEN SATURATION: 97 % | TEMPERATURE: 98 F | SYSTOLIC BLOOD PRESSURE: 103 MMHG

## 2024-12-18 VITALS — WEIGHT: 182.98 LBS | HEIGHT: 71 IN | TEMPERATURE: 98 F

## 2024-12-18 DIAGNOSIS — F20.9 SCHIZOPHRENIA, UNSPECIFIED: ICD-10-CM

## 2024-12-18 PROCEDURE — 99214 OFFICE O/P EST MOD 30 MIN: CPT

## 2024-12-18 RX ORDER — LORAZEPAM 1 MG/1
2 TABLET ORAL EVERY 6 HOURS
Refills: 0 | Status: DISCONTINUED | OUTPATIENT
Start: 2024-12-18 | End: 2024-12-23

## 2024-12-18 RX ORDER — HALOPERIDOL DECANOATE 50 MG/ML
5 INJECTION INTRAMUSCULAR EVERY 6 HOURS
Refills: 0 | Status: DISCONTINUED | OUTPATIENT
Start: 2024-12-18 | End: 2025-01-07

## 2024-12-18 RX ORDER — HALOPERIDOL DECANOATE 50 MG/ML
5 INJECTION INTRAMUSCULAR ONCE
Refills: 0 | Status: DISCONTINUED | OUTPATIENT
Start: 2024-12-18 | End: 2025-01-07

## 2024-12-18 RX ORDER — LORAZEPAM 1 MG/1
2 TABLET ORAL ONCE
Refills: 0 | Status: DISCONTINUED | OUTPATIENT
Start: 2024-12-18 | End: 2024-12-23

## 2024-12-18 RX ORDER — DIPHENHYDRAMINE HCL 25 MG
50 TABLET ORAL EVERY 6 HOURS
Refills: 0 | Status: DISCONTINUED | OUTPATIENT
Start: 2024-12-18 | End: 2024-12-19

## 2024-12-18 RX ORDER — DIPHENHYDRAMINE HCL 25 MG
50 TABLET ORAL ONCE
Refills: 0 | Status: DISCONTINUED | OUTPATIENT
Start: 2024-12-18 | End: 2024-12-19

## 2024-12-18 RX ADMIN — Medication 50 MILLIGRAM(S): at 22:40

## 2024-12-18 NOTE — ED ADULT NURSE REASSESSMENT NOTE - NS ED NURSE REASSESS COMMENT FT1
received pt medically cleared for transfer to the  unit. pt is a&o x3 with a flat affect. pt denies suicidal or homicidal and auditory or visual. pt reports that he has been arguing with his mother and she called the police on him. pt denies any physical altercation with his mother. pt reports being compliant with his medication Invega sustenna monthly injections.
self isolating to his room sleeping at this time with no attempts to harm self or others.
requesting and received sandwich and po fluid remains calm. speech is slow. affect flat, strange

## 2024-12-18 NOTE — ED BEHAVIORAL HEALTH PROGRESS NOTE - RISK ASSESSMENT
RF: Recent symptoms of aggression, refusal of treatment  PF: Domiciled, connected to treatment    Pt presents with minimal insight into his recent behavior or remorse for his actions but denies any current SI/HI/AH/VH/paranoia,  Collateral from mother endorses recent aggression and safety concerns with the pt returning home requesting a higher level of care given his recent mood lability and aggression. Pt will be held for involuntary transfer pending bed availability.

## 2024-12-18 NOTE — ED BEHAVIORAL HEALTH NOTE - BEHAVIORAL HEALTH NOTE
SW Note: Plan is to transfer pt for IPP. telepsych has been working on the bed search. earlier a bed was found at  but pt and his mother Corin [phone] requested not being admitted to  because they have family that works on the  unit and she works at the facility as well. tele notified and they are not exploring a possible bed at [facility]. 9.27 legals completed. SW will follow

## 2024-12-18 NOTE — BH PATIENT PROFILE - FUNCTIONAL ASSESSMENT - BASIC MOBILITY 5.
4 = No assist / stand by assistance Methotrexate Pregnancy And Lactation Text: This medication is Pregnancy Category X and is known to cause fetal harm. This medication is excreted in breast milk.

## 2024-12-18 NOTE — CHART NOTE - NSCHARTNOTEFT_GEN_A_CORE
Screening Medical Evaluation    Patient Admitted from: [facility] ED    ZH admitting diagnosis: Schizophrenia      PAST MEDICAL & SURGICAL HISTORY:  History of schizophrenia      No significant past surgical history            Allergies    Allergy Status Unknown    Intolerances          Social History:       FAMILY HISTORY:        MEDICATIONS  (STANDING):    MEDICATIONS  (PRN):  diphenhydrAMINE 50 milliGRAM(s) Oral every 6 hours PRN agitation  diphenhydrAMINE Injectable 50 milliGRAM(s) IntraMuscular once PRN severe agitation  haloperidol     Tablet 5 milliGRAM(s) Oral every 6 hours PRN agitation  haloperidol    Injectable 5 milliGRAM(s) IntraMuscular once PRN severe agitation  LORazepam     Tablet 2 milliGRAM(s) Oral every 6 hours PRN agitation  LORazepam   Injectable 2 milliGRAM(s) IntraMuscular once PRN severe agitation        Vital Signs Last 24 Hrs  T(C): 36.7 (18 Dec 2024 18:43), Max: 36.9 (17 Dec 2024 20:15)  T(F): 98 (18 Dec 2024 18:43), Max: 98.5 (17 Dec 2024 20:15)  HR: 72 (18 Dec 2024 15:23) (61 - 99)  BP: 103/54 (18 Dec 2024 15:23) (103/54 - 128/79)  BP(mean): --  RR: 18 (18 Dec 2024 15:23) (18 - 19)  SpO2: 97% (18 Dec 2024 15:23) (97% - 99%)      CAPILLARY BLOOD GLUCOSE            PHYSICAL EXAM:  GENERAL: NAD  HEAD:  Atraumatic, Normocephalic  EYES: EOMI, PERRLA, conjunctiva and sclera clear  NECK: Supple, No JVD  CHEST/LUNG: Clear to auscultation bilaterally; No wheeze  HEART: Regular rate and rhythm; No murmurs, rubs, or gallops  ABDOMEN: Positive BS, Soft, Nontender, Nondistended; Bowel sounds present  EXTREMITIES:  2+ Peripheral Pulses, No clubbing, cyanosis, or edema  PSYCH: Calm and cooperative   NEUROLOGY: non-focal  SKIN: No rashes or lesions seen on exposed skin     LABS:                        13.5   7.51  )-----------( 337      ( 17 Dec 2024 18:00 )             40.2     12-17    139  |  102  |  11.3  ----------------------------<  96  4.9   |  25.0  |  0.75    Ca    9.5      17 Dec 2024 18:00    TPro  7.7  /  Alb  4.5  /  TBili  0.6  /  DBili  x   /  AST  20  /  ALT  15  /  AlkPhos  97  12-17          Urinalysis Basic - ( 17 Dec 2024 18:00 )    Color: Yellow / Appearance: Clear / SG: <1.005 / pH: x  Gluc: 96 mg/dL / Ketone: Negative mg/dL  / Bili: Negative / Urobili: 0.2 mg/dL   Blood: x / Protein: Negative mg/dL / Nitrite: Negative   Leuk Esterase: Negative / RBC: 0 /HPF / WBC 0 /HPF   Sq Epi: x / Non Sq Epi: 0 /HPF / Bacteria: Negative /HPF        RADIOLOGY & ADDITIONAL TESTS:      Assessment and Plan:  24M admitted to [facility] for Schizophrenia.  No PMHx.  Pt seen for medical screening evaluation. Patient has no acute complaints at this time. Patient denies fever, chills, headache, dizziness, lightheadedness, N/V, SOB, cough, congestion, chest pain, abdominal pain, dysuria, hematuria, diarrhea, constipation. Physical exam unremarkable, VSS. Labs above. 12/17 EKG NSR @ 67b/ min QT/ QTC= 382/ 403.     1.)  Schizophrenia: Plan per primary team.

## 2024-12-18 NOTE — ED BEHAVIORAL HEALTH PROGRESS NOTE - SUMMARY
24 year old, male; domiciled with mother; single; noncaregiver; unemployed; past psychiatric history of schizophrenia; 2 previous psychiatric hospitalizations at [facility]; no known suicide attempts; known history of violence and aggression in the home; no arrests; recent cannabis use; no known history of complicated withdrawal; no PMH; brought in by EMS; called by mother; presenting with agitation; in the setting of refusing to attend his appt today with his psychiatrist at [facility].

## 2024-12-18 NOTE — ED BEHAVIORAL HEALTH PROGRESS NOTE - DETAILS:
Evaluated for follow up. No acute events overnight per report. has been maintaining behavioral control without any incidents overnight. no acute psychiatric concerns or complaints. minimizing events leading up to presentation. states that it has been >2 months since his last invega sustenna injection.

## 2024-12-18 NOTE — BH CHART NOTE - NSEVENTNOTEFT_PSY_ALL_CORE
HPI: per  assessment "Patient is a 24 year old, male; domiciled with mother; single; noncaregiver; unemployed; past psychiatric history of schizophrenia; 2 previous psychiatric hospitalizations at [facility]; no known suicide attempts; known history of violence and aggression in the home; no arrests; recent cannabis use; no known history of complicated withdrawal; no PMH; brought in by EMS; called by mother; presenting with agitation; in the setting of refusing to attend his appt today with his psychiatrist at [facility].    Pt presents with minimal insight into his recent behavior or remorse for his actions but denies any current SI/HI/AH/VH/paranoia, Collateral from mother states that she was driving the pt to his psychiatrist appt today when the pt became acutely agitated stating he would not attend the appt and was refusing his injection. Mother states the pt began screaming and yelling and cursing in the car. Per mother, the pt's mood has become increasingly more irritable and aggressive lately. 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 Mother endorses the pt breaking doors and throwing things in the home and does not feel safe with the pt in the home. Mother expresses safety concerns and feels the pt may warrant a psychiatric admission at this time"    Patient evaluated by BETTY, confirms the above findings. On assessment, patient is…    PPH: see HPI    PMH: see HPI    Medications:     Allergies: none known     Substance:    Suicidality:     Family Hx:    Social Hx:    Access to weapons/guns:     Vitals:  T(F): 98 (12-18-24 @ 18:43), Max: 98.5 (12-17-24 @ 20:15)  HR: 72 (12-18-24 @ 15:23) (61 - 99)  BP: 103/54 (12-18-24 @ 15:23) (103/54 - 128/79)  RR: 18 (12-18-24 @ 15:23) (18 - 19)  SpO2: 97% (12-18-24 @ 15:23) (97% - 99%)    MSE:  Appearance: appears stated age, dressed casually, well groomed. Behavior: cooperative, appropriate eye contact, in good behavioral control. Motor: no PMR/PMA. No abnormal movements including tremor. Speech: no increased latency, normal rate, tone, volume. TP: linear, goal-directed. TC: future-oriented. Denies SI/HI/I/P, no urges for self-harm. No apparent delusions. Denies AH/VH. Mood: "Fine." Affect: Dysphoric, reactive, mood congruent, appropriate. Cognition: alert, oriented to person, place, month and year. Fund of knowledge: intact. Attention/Concentration: intact. Insight: fair. Judgment: fair. Impulse control: fair.    Labs:                        13.5   7.51  )-----------( 337      ( 17 Dec 2024 18:00 )             40.2     12-17    139  |  102  |  11.3  ----------------------------<  96  4.9   |  25.0  |  0.75    Ca    9.5      17 Dec 2024 18:00    TPro  7.7  /  Alb  4.5  /  TBili  0.6  /  DBili  x   /  AST  20  /  ALT  15  /  AlkPhos  97  12-17    Urinalysis Basic - ( 17 Dec 2024 18:00 )    Color: Yellow / Appearance: Clear / SG: <1.005 / pH: x  Gluc: 96 mg/dL / Ketone: Negative mg/dL  / Bili: Negative / Urobili: 0.2 mg/dL   Blood: x / Protein: Negative mg/dL / Nitrite: Negative   Leuk Esterase: Negative / RBC: 0 /HPF / WBC 0 /HPF   Sq Epi: x / Non Sq Epi: 0 /HPF / Bacteria: Negative /HPF      Drug Screen W/PCP, Urine: Done (12-17-24 @ 18:00)      Risk Assessment: Immediate risk is minimized by inpatient admission to safe environment with appropriate supervision and limited access to lethal means. Futue risk to be minimized by treament of acute [INSERT HERE] symptoms, maximizing outpatient supports, providing patient education, discussing emergency procedures, and ensuring close follow-up.    Acute risk factors include: single, male, current SI/I/P, hopelessness/helplessness, recent suicide attempt, NSSIB, access to means, severe anxiety, insomnia, agitation, impulsivity, active psychosis, active mood episode, active substance use, acute psychosocial stressors, poor reactivity to stressors, difficulty expressing emotions, low frustration tolerance, experiencing homelessness, social isolation, noncompliant with treatment/not receiving treatment, , limited insight into symptoms, academic/occupational decline, absence of outpatient follow-up, poor social supports, recent inpatient discharge, recent loss, unable to care for self secondary to psychiatric illness.    Chronic risk factors for suicide include: h/o prior psychiatric admissions, diagnosis of XXX d/o, prior suicide attempts, h/o NSSIB, family history of suicidality, h/o trauma/abuse, chronic pain.   Protective factors include: Young, healthy, denies SI/I/P, no history of suicide attempts, no history of NSSIB, identifies reasons for living, fear of death/dying due to pain/suffering, future oriented, no prior psychiatric admissions, no active substance use, no active psychosis, engaged in work or school, dependent children, stable housing, intact marriage, strong social supports, high spirituality, positive therapeutic relationship, engaged in treatment, ability to cope with stress, high frustration tolerance, medication/follow up compliance, help-seeking behaviors, no legal history, adequate outpatient follow up with motivation to participate in care.      Based on risk assessment evaluation, patient IS/IS NOT at acute risk of harm to self or others at this time, DOES/DOES NOT require 1:1 CO.      Assessment/Plan:  24 year old, male; domiciled with mother; single; noncaregiver; unemployed; past psychiatric history of schizophrenia; 2 previous psychiatric hospitalizations at [facility]; no known suicide attempts; known history of violence and aggression in the home; no arrests; recent cannabis use; no known history of complicated withdrawal; no PMH; brought in by EMS; called by mother; presenting with agitation; in the setting of refusing to attend his appt today with his psychiatrist at [facility].      Pt presents with minimal insight into his recent behavior or remorse for his actions but denies any current SI/HI/AH/VH/paranoia,  Collateral from mother endorses recent aggression and safety concerns with the pt returning home requesting a higher level of care given his recent mood lability and aggression. Pt will be held for involuntary transfer pending bed availability.      [One-liner]    Plan:  1.	Legals: admit to  2.	Safety: routine observation, denies SI/HI/I/P on the unit. PRNs: Ativan/Haldol/Benadryl PO/IM  3.	Psychiatry:  4.	Group, milieu, individual therapy as appropriate.  5.	Medical: no acute medical issues, no significant PMH.  Admission labs WNL.  6.	Dispo: pending clinical improvement.  Patient continues to require inpatient hospitalization for stabilization and safety.    Patient requires inpatient admission for stabilization. HPI: per  assessment "Patient is a 24 year old, male; domiciled with mother; single; noncaregiver; unemployed; past psychiatric history of schizophrenia; 2 previous psychiatric hospitalizations at [facility]; no known suicide attempts; known history of violence and aggression in the home; no arrests; recent cannabis use; no known history of complicated withdrawal; no PMH; brought in by EMS; called by mother; presenting with agitation; in the setting of refusing to attend his appt today with his psychiatrist at [facility].    Pt presents with minimal insight into his recent behavior or remorse for his actions but denies any current SI/HI/AH/VH/paranoia, Collateral from mother states that she was driving the pt to his psychiatrist appt today when the pt became acutely agitated stating he would not attend the appt and was refusing his injection. Mother states the pt began screaming and yelling and cursing in the car. Per mother, the pt's mood has become increasingly more irritable and aggressive lately. 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 Mother endorses the pt breaking doors and throwing things in the home and does not feel safe with the pt in the home. Mother expresses safety concerns and feels the pt may warrant a psychiatric admission at this time"    Patient evaluated by SPOC and R1, confirms the above findings. On assessment, patient cooperative, fidgeting anxiously. Minimizes the above information. States his mother brought him in after an argument, denies yelling, screaming. Denies current SI/HI/VH/paranoia, endorses some anxiety. Pt confirms that he has been taking Invega injections but missed his appointment yesterday. Pt unable to provide reason for missing appointment. Patient with no current active complaints, and only requests to eat and states his desire to go home as soon as possible.     PPH: see HPI    PMH: see HPI    Medications: Monthly Invega Injection     Allergies: none known     Substance: Occasional Marijuana use, socially drinks     Suicidality:   · Current Passive Ideation: None known  · Current Active Ideation: None known  · Current Plan: None known  · Current Intent: None known  · Suicide Attempt: None known  · Interrupted Attempt: None known  · Aborted (self-interrupted) Attempt: None known  · Preparatory Acts: None known  · Self injurious behavior without suicidal intent: None known      Family Hx: none known     Social Hx: Domiciled with mother; unemployed; HS graduate    Access to weapons/guns: no     Vitals:  T(F): 98 (12-18-24 @ 18:43), Max: 98.5 (12-17-24 @ 20:15)  HR: 72 (12-18-24 @ 15:23) (61 - 99)  BP: 103/54 (12-18-24 @ 15:23) (103/54 - 128/79)  RR: 18 (12-18-24 @ 15:23) (18 - 19)  SpO2: 97% (12-18-24 @ 15:23) (97% - 99%)    · Level of Consciousness: Alert  · General Appearance: No deformities present  · Body Habitus: average build  · Hygiene: Fair  · Grooming: Fair  · Behavior: Cooperative, fidgeting  · Eye Contact: poor  · Relatedness: Fair  · Impulse Control: Impaired  · Muscle Tone/Strength	Normal muscle tone/strength  · Abnormal Movements	No abnormal movements  · Gait/Station: Normal gait / station  · Speech: Normal volume, rate, productivity, spontaneity and articulation  · Mood: "im ok"  · Affect Quality: calm  · Affect Range: Full  · Affect Congruence: Congruent  · Thought Process: Linear  · Thought Associations	Normal  · Thought Content	Unremarkable  · Perceptions	No abnormalities  · Orientation	Oriented to time, place, person, situation  · Attention/Concentration	Normal  · Estimated Intelligence	Average  · Recent Memory	Normal  · Remote Memory	Normal  · Fund of Knowledge	Normal  · How Fund of Knowledge Assessed	Educational attainment  · Language	No abnormalities noted  · Judgment	Poor  · Insight	fair      Labs:                        13.5   7.51  )-----------( 337      ( 17 Dec 2024 18:00 )             40.2     12-17    139  |  102  |  11.3  ----------------------------<  96  4.9   |  25.0  |  0.75    Ca    9.5      17 Dec 2024 18:00    TPro  7.7  /  Alb  4.5  /  TBili  0.6  /  DBili  x   /  AST  20  /  ALT  15  /  AlkPhos  97  12-17      Urine TOX:  + for marijuana    Urinalysis Basic - ( 17 Dec 2024 18:00 )    Color: Yellow / Appearance: Clear / SG: <1.005 / pH: x  Gluc: 96 mg/dL / Ketone: Negative mg/dL  / Bili: Negative / Urobili: 0.2 mg/dL   Blood: x / Protein: Negative mg/dL / Nitrite: Negative   Leuk Esterase: Negative / RBC: 0 /HPF / WBC 0 /HPF   Sq Epi: x / Non Sq Epi: 0 /HPF / Bacteria: Negative /HPF      Drug Screen W/PCP, Urine: Done (12-17-24 @ 18:00)         Suicide Risk Factors:  · Suicide Risk Factors: Current and Past Psychiatric Diagnoses, Presenting Symptoms  · Current and Past Psychiatric Diagnoses	Psychotic disorder  · Presenting Symptoms	Severe anxiety, agitation or panic     Suicide Protective Factors:  · Suicide Protective Factors: Responsibility to, family, or others, Identifies reasons for living, Fear of death or the actual act of killing self     Violence Risk Factors:  · Violence Risk Factors:	Feeling of being under threat and being unable to control threat, Violent ideation/threat/speech, Affective dysregulation, Impulsivity     Violence Protective Factors:  Residential stability, Engagement in treatment    Risk assessment:   RF: Recent symptoms of aggression, refusal of treatment  PF: Domiciled, connected to treatment  · Potential Violence Risk: Moderate  · Risk Assessment: Low Acute Suicide Risk  · Elevated Chronic Suicide Risk: No        Assessment/Plan:  24 year old, male; domiciled with mother; single; noncaregiver; unemployed; past psychiatric history of schizophrenia; 2 previous psychiatric hospitalizations at [facility]; no known suicide attempts; known history of violence and aggression in the home; no arrests; recent cannabis use; no known history of complicated withdrawal; no PMH; brought in by EMS; called by mother; presenting with agitation; in the setting of refusing to attend his appt today with his psychiatrist at [facility].      Pt presents with minimal insight into his recent behavior or remorse for his actions but denies any current SI/HI/AH/VH/paranoia,  Collateral from mother endorses recent aggression and safety concerns with the pt returning home requesting a higher level of care given his recent mood lability and aggression. Pt will benefit from inpatient stabilization and continuation of Invega injection.       Plan:  1. Legals: admit to ML4, 9.27  2. Safety: routine observation, denies SI/HI/I/P on the unit. PRNs: Ativan 2mg po PRN/Haldol 5mg po/Benadryl 50mg PO PRN/IM  3. Psychiatry: Will defer decision to start monthly Invega injections to primary team   4. Group, milieu, individual therapy as appropriate.   5. Medical: no acute medical issues, no significant PMH.  Admission labs WNL. Regular diet   6. Dispo: pending clinical improvement.  Patient continues to require inpatient hospitalization for stabilization and safety.    Patient requires inpatient admission for stabilization.

## 2024-12-19 PROCEDURE — 99222 1ST HOSP IP/OBS MODERATE 55: CPT

## 2024-12-19 RX ORDER — RISPERIDONE 0.5 MG/1
2 TABLET ORAL AT BEDTIME
Refills: 0 | Status: DISCONTINUED | OUTPATIENT
Start: 2024-12-19 | End: 2024-12-24

## 2024-12-19 RX ADMIN — RISPERIDONE 2 MILLIGRAM(S): 0.5 TABLET ORAL at 20:05

## 2024-12-19 NOTE — PSYCHIATRIC REHAB INITIAL EVALUATION - NSBHPRRECOMMEND_PSY_ALL_CORE
Patient is a 24-year-old male admitted to 4 for increased irritability and moodiness. Patient stated that the  were called due to an argument with mother and brought to ED. Patient has a history of 2 psychiatric hospitalizations at [facility]. Patient was oriented to [facility] unit as well as the role/function of the Psychiatric Rehabilitation department and provided with a unit schedule detailing hourly activities. Patient verbalized understanding of the services as they pertain to experience and engagement.  Upon assessment, patient was cooperative but guarded and discharge focused. Patient presented as thought blocked and suspicious upon answering questions. Patient has a psychiatric history of schizophrenia diagnosis as well as daily THC use. Additionally, patient has little to no insight regarding current illness and is a poor historian as the details provided by patient contradict with chart information. Patient currently sees a psychiatrist and receives a monthly Invega shot. Patient denies SI/HI as well as AH/VH, however, endorsed paranoia manifesting as “others out to get me” as a result of past threats. Patient detailed coping skills such as movies, playing soccer, and games on his mobile phone in order to assist with negative symptoms.   Based on patient presentation and content of conversation, patient may benefit from a treatment goal directed towards agitation.  Psychiatric Rehabilitation therapist plan to meet with the patient on a weekly basis in order to assess progress towards established goal.

## 2024-12-19 NOTE — BH INPATIENT PSYCHIATRY ASSESSMENT NOTE - NSBHCHARTREVIEWVS_PSY_A_CORE FT
Vital Signs Last 24 Hrs  T(C): 36.6 (12-19-24 @ 08:22), Max: 36.8 (12-18-24 @ 15:23)  T(F): 97.9 (12-19-24 @ 08:22), Max: 98.2 (12-18-24 @ 15:23)  HR: 72 (12-18-24 @ 15:23) (72 - 72)  BP: 103/54 (12-18-24 @ 15:23) (103/54 - 103/54)  BP(mean): --  RR: 18 (12-18-24 @ 15:23) (18 - 18)  SpO2: 97% (12-18-24 @ 15:23) (97% - 97%)    Orthostatic VS  12-19-24 @ 08:22  Lying BP: --/-- HR: --  Sitting BP: 103/69 HR: 78  Standing BP: 108/85 HR: 85  Site: --  Mode: --  Orthostatic VS  12-18-24 @ 18:43  Lying BP: --/-- HR: --  Sitting BP: 130/62 HR: 75  Standing BP: 124/68 HR: 84  Site: --  Mode: --   Vital Signs Last 24 Hrs  T(C): 36.6 (12-19-24 @ 08:22), Max: 36.7 (12-18-24 @ 18:43)  T(F): 97.9 (12-19-24 @ 08:22), Max: 98 (12-18-24 @ 18:43)  HR: --  BP: --  BP(mean): --  RR: --  SpO2: --    Orthostatic VS  12-19-24 @ 08:22  Lying BP: --/-- HR: --  Sitting BP: 103/69 HR: 78  Standing BP: 108/85 HR: 85  Site: --  Mode: --  Orthostatic VS  12-18-24 @ 18:43  Lying BP: --/-- HR: --  Sitting BP: 130/62 HR: 75  Standing BP: 124/68 HR: 84  Site: --  Mode: --

## 2024-12-19 NOTE — BH INPATIENT PSYCHIATRY ASSESSMENT NOTE - MSE UNSTRUCTURED FT
Pt is a 25yo man with fair grooming and hygiene. He is calm and cooperative with appropriate eye contact. No psychomotor abnormalities noted. Speech is normal in rate and volume, spontaneous. Stated mood is "fine." Affect is mostly euthymic, irritable edge, constricted. TP is linear. TC without evidence of layne delusions, denies SIIP and HIIP, focused on discharge. No evidence of perceptual disturbance at this time. Insight and judgement are limited. Impulse control is currently intact though recently tenuous.

## 2024-12-19 NOTE — BH INPATIENT PSYCHIATRY ASSESSMENT NOTE - NSTREATMENTCERTY_PSY_ALL_CORE
No That inpatient services furnished since the previous certification or recertification were, and continue to be required: for treatment that could reasonably be expected to improve the patient's condition; or, for diagnostic study;    That the hospital records show that the services furnished were: intensive treatment services; admission and related services necessary for diagnostic study or equivalent services;    That the patient continues to need, on a daily basis active inpatient treatment furnished directly by or requiring the supervision of inpatient psychiatric facility personnel.

## 2024-12-19 NOTE — BH SOCIAL WORK INITIAL PSYCHOSOCIAL EVALUATION - NSBHTREATHXNAMEFT_PSY_ALL_CORE
[facility] [address] [place] [address] [phone]

## 2024-12-19 NOTE — BH SOCIAL WORK INITIAL PSYCHOSOCIAL EVALUATION - NSBHSASOBERLONGFT_PSY_ALL_CORE
Pt reported that he has stopped smoking last year, however, per clinicals; Pt tested positive for THC

## 2024-12-19 NOTE — BH INPATIENT PSYCHIATRY ASSESSMENT NOTE - HPI (INCLUDE ILLNESS QUALITY, SEVERITY, DURATION, TIMING, CONTEXT, MODIFYING FACTORS, ASSOCIATED SIGNS AND SYMPTOMS)
Patient is a 24 year old, male; domiciled with mother; single; noncaregiver; unemployed; past psychiatric history of schizophrenia; 2 previous psychiatric hospitalizations at [facility]; no known suicide attempts; known history of violence and aggression in the home; no arrests; recent cannabis use; no known history of complicated withdrawal; no PMH; brought in by EMS; called by mother; presenting with agitation; in the setting of refusing to attend his appt today with his psychiatrist at [facility].    Pt presents with minimal insight into his recent behavior or remorse for his actions but denies any current SI/HI/AH/VH/paranoia, Collateral from mother states that she was driving the pt to his psychiatrist appt today when the pt became acutely agitated stating he would not attend the appt and was refusing his injection. Mother states the pt began screaming and yelling and cursing in the car. Per mother, the pt's mood has become increasingly more irritable and aggressive lately. 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 Mother endorses the pt breaking doors and throwing things in the home and does not feel safe with the pt in the home. Mother expresses safety concerns and feels the pt may warrant a psychiatric admission at this time. Patient is a 25yo man who lives with his mother, is recently unemployed (fired from job at [employer] 3 weeks ago), with PPHx schizophrenia (vs substance induced psychosis?), two prior hospitalizations at [facility], no prior suicide attempts, +hx of violence and aggression at home, recent cannabis use admitted for increasing aggressive behavior at home and refusing to attend outpt appt at [facility] (refused Invega Sustenna).    Per assessment at [facility] ED:  Pt presents with minimal insight into his recent behavior or remorse for his actions but denies any current SI/HI/AH/VH/paranoia, Collateral from mother states that she was driving the pt to his psychiatrist appt today when the pt became acutely agitated stating he would not attend the appt and was refusing his injection. Mother states the pt began screaming and yelling and cursing in the car. Per mother, the pt's mood has become increasingly more irritable and aggressive lately. Mother feels that the pt's monthly Invega shot is no longer therapeutic and states for the past 2-3 weeks the pt has been up all night and extremely irritable and "cortes." She states the pt did have a job at a [employer] but was fired 3 weeks ago after an altercation with a co-worker. Mother endorses the pt breaking doors and throwing things in the home and does not feel safe with the pt in the home. Mother expresses safety concerns and feels the pt may warrant a psychiatric admission at this time.    On admission assessment to 4:  Pt reports he was in the car with his mother to attend psych appointment at [facility] for monthly Invega Sustenna injection and he had an argument with her and refused to go. Says he does not like getting monthly injection due to pain he experiences, also does not believe he needs the injection. When questioned about agitation and aggression at home pt admits to breaking a door at home and says that his mother triggers him and he gets frustrated and upset. Pt reports recent fair mood, denies SIIP and HIIP. He reports sleeping well and having a good appetite and energy. He denies feeling paranoid, believing he is being surveilled, thoughts manipulation, or AH and VH. States that in the past he may have experienced such symptoms which he relates to prior hospitalization and current treatment with Invega Sustenna. He says that at the time he was smoking marijuana consistently. Says he still smokes now though infrequently. Denies other substance use now, including alcohol. Pt frustrated at current hospitalization, desires discharge so he can look for a new job at [employer]. Says he was fired from job at [employer] due to verbal altercation with another employee. Agreeable to start PO Risperdal pending collateral from outpatient psychiatrist.     Spoke with pt's mother who reports that for the past year pt has been extremely "mood and angry." Says that at home he has broken many doors and throws the refrigerator and microwave. Says that she is at time afraid of him. Says that patient is very negative. Says he sleeps very poorly. She is not able to identify any frankly psychotic symptoms when asked. She reports the above behavior despite previous compliance with Invega Sustenna.

## 2024-12-19 NOTE — BH INPATIENT PSYCHIATRY ASSESSMENT NOTE - NSBHASSESSSUMMFT_PSY_ALL_CORE
Patient is a 23yo man who lives with his mother, is recently unemployed (fired from job at [employer] 3 weeks ago due to verbal altercation with other employee), with PPHx schizophrenia (vs substance induced psychosis?), two prior hospitalizations at [facility], no prior suicide attempts, +hx of violence and aggression at home, recent cannabis use admitted for increasing aggressive behavior at home and refusing to attend outpt appt at [facility] (refused Invega Sustenna).    At present pt without acute mood or psychotic symptoms, though further evaluation and monitoring is necessary. Recent agitation and aggressive behavior (with property destruction) may be related to psychosis vs marijuana use vs behavioral in nature (r/o ODD, cluster b personality pathology). Pt currently calm and cooperative on the unit. Denies SIIP and HIIP.     Plan:  - Admit to ML4, 9.27 legal status  - Routine checks  - Unclear dose and last administration of monthly Invega Sustenna, will seek collateral from [facility] (unable to reach today), for now will start pt on PO Risperdal 2mg qhs  - Ativan 2mg and Haldol 5mg PO/IM prn agitation   - Will continue to provide updates to pt's mother  - Pending collateral from [facility]    Patient is a 25yo man who lives with his mother, is recently unemployed (fired from job at [employer] 3 weeks ago due to verbal altercation with other employee), with PPHx schizophrenia (vs substance induced psychosis?), two prior hospitalizations at [facility], no prior suicide attempts, +hx of violence and aggression at home, recent cannabis use admitted for increasing aggressive behavior at home and refusing to attend outpt appt at [facility] (refused Invega Sustenna).    At present pt without acute mood or psychotic symptoms, though further evaluation and monitoring is necessary. Recent agitation and aggressive behavior (with property destruction) may be related to psychosis vs marijuana use vs behavioral in nature (r/o ODD, cluster b personality pathology). Pt currently calm and cooperative on the unit. Denies SIIP and HIIP.     Plan:  - Admit to ML4, 9.27 legal status  - Routine checks  - Unclear dose and last administration of monthly Invega Sustenna, will seek collateral from [facility] (unable to reach today), for now will start pt on PO Risperdal 2mg qhs  - Ativan 2mg and Haldol 5mg PO/IM prn agitation   - Admission labs and EKG reviewed and within normal limits (utox THC+)  - Will continue to provide updates to pt's mother  - Pending collateral from [facility]

## 2024-12-19 NOTE — BH INPATIENT PSYCHIATRY ASSESSMENT NOTE - OTHER PAST PSYCHIATRIC HISTORY (INCLUDE DETAILS REGARDING ONSET, COURSE OF ILLNESS, INPATIENT/OUTPATIENT TREATMENT)
see HPI dx schizophrenia (vs substance induced psychosis?), two prior hospitalizations at [facility], no prior suicide attempts

## 2024-12-19 NOTE — BH INPATIENT PSYCHIATRY ASSESSMENT NOTE - VIOLENCE RISK FACTORS:
Feeling of being under threat and being unable to control threat/Violent ideation/threat/speech/Affective dysregulation/Impulsivity Affective dysregulation/Impulsivity/Noncompliance with treatment

## 2024-12-19 NOTE — BH INPATIENT PSYCHIATRY ASSESSMENT NOTE - CURRENT MEDICATION
MEDICATIONS  (STANDING):    MEDICATIONS  (PRN):  diphenhydrAMINE 50 milliGRAM(s) Oral every 6 hours PRN agitation  diphenhydrAMINE Injectable 50 milliGRAM(s) IntraMuscular once PRN severe agitation  haloperidol     Tablet 5 milliGRAM(s) Oral every 6 hours PRN agitation  haloperidol    Injectable 5 milliGRAM(s) IntraMuscular once PRN severe agitation  LORazepam     Tablet 2 milliGRAM(s) Oral every 6 hours PRN agitation  LORazepam   Injectable 2 milliGRAM(s) IntraMuscular once PRN severe agitation   MEDICATIONS  (STANDING):  risperiDONE   Tablet 2 milliGRAM(s) Oral at bedtime    MEDICATIONS  (PRN):  haloperidol     Tablet 5 milliGRAM(s) Oral every 6 hours PRN agitation  haloperidol    Injectable 5 milliGRAM(s) IntraMuscular once PRN severe agitation  LORazepam     Tablet 2 milliGRAM(s) Oral every 6 hours PRN agitation  LORazepam   Injectable 2 milliGRAM(s) IntraMuscular once PRN severe agitation

## 2024-12-19 NOTE — BH SOCIAL WORK INITIAL PSYCHOSOCIAL EVALUATION - OTHER PAST PSYCHIATRIC HISTORY (INCLUDE DETAILS REGARDING ONSET, COURSE OF ILLNESS, INPATIENT/OUTPATIENT TREATMENT)
Pt is a 25 yo male; domiciled with mother, single; non caregiver, unemployed; past psychiatric hx of schizophrenia, 2 previous psychiatric hospitalizations at [facility]; no known suicide attempts; known history of violence and aggression in the home, no arrests, recent cannabis use; no known history of complicated withdrawal; no PMH; BIB EMS called by mother; presenting with agitation; in the setting of refusing to attend his appt today with his psychiatrist at [facility]. Per clinicals, collateral from mother, states that she was driving the pt to his psychiatrist appt when the Pt became acute agitated stating that he would not attend the appt and was refusing his Invega injection. Mother stated the Pt began screaming and cursing in the car. Mother also explained that he has become increasingly irritable and aggressive lately. Mother also reported that the Invega shot is no longer therapeutic and states for the past 2-3 weeks the Pt has been up all night and extremely irritable and cortes. 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 Lastly, mother endorsed Pt breaking doors, throwing things in the home and does not feel safe with the Pt at home.     SWI met with Pt to discuss admission. Pt presents unkempt with guarded mood and congruent affect. Pt explained that the reason for his admission was because of his mom. Pt did not want to elaborate further and only shared limited details. Pt explained that he got in an argument with his mom and then things escalated and "got the  called on me". Pt is single, unemployed, and domiciled with his mom and sister. Pt explained he has a hx of a past hospitalization in [facility] due to "smoking". However, per clinicals, pt has two previous hospitalizations. Pt confirmed he has no hx of SSIB or suicide attempts. Pt denied SI/HI/AH/VH and paranoid delusions. However, he expressed he's felt paranoid last year because he received threats from "a alannah on [employer]". Pt has an education hx of a high school diploma and has recently been fired from his job. 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 Pt denies family hx of  mental illness. Pt denies hx of substance use and explained that he stopped smoking last year. However, per clinicals, family collateral endorses recent cannabis use. Pt denies hx of legal issues and has no allergies to medication or foods. Pt also shared that he likes [employer].

## 2024-12-19 NOTE — BH SOCIAL WORK INITIAL PSYCHOSOCIAL EVALUATION - NSBHSATHC_PSY_A_CORE FT
Pt endorsed cannabis use last year, however, per clinicals and family collateral Pt has recent marihuana

## 2024-12-19 NOTE — BH SOCIAL WORK INITIAL PSYCHOSOCIAL EVALUATION - NSHIGHRISKBEHFT_PSY_ALL_CORE
Per clinicals Pt has a known hx of violence and aggression; breaking doors, throwing things in the home, altercation with past co-worker. Mother is worried about her safety in the home.

## 2024-12-19 NOTE — BH INPATIENT PSYCHIATRY ASSESSMENT NOTE - NSTXDCOTHRGOAL_PSY_ALL_CORE
No, antibiotic would not be indicated for this.   Pt will show a reduction of symptoms and engage meaningfully with writer to identify a safe discharge plan. Pt will comply with recommended tx plan and medications for 5 days. Identify 2 benefits for adherring to tx.

## 2024-12-19 NOTE — BH INPATIENT PSYCHIATRY ASSESSMENT NOTE - NSBHMETABOLIC_PSY_ALL_CORE_FT
BMI: BMI (kg/m2): 25.5 (12-18-24 @ 18:43)  HbA1c:   Glucose:   BP: --Vital Signs Last 24 Hrs  T(C): 36.6 (12-19-24 @ 08:22), Max: 36.8 (12-18-24 @ 15:23)  T(F): 97.9 (12-19-24 @ 08:22), Max: 98.2 (12-18-24 @ 15:23)  HR: 72 (12-18-24 @ 15:23) (72 - 72)  BP: 103/54 (12-18-24 @ 15:23) (103/54 - 103/54)  BP(mean): --  RR: 18 (12-18-24 @ 15:23) (18 - 18)  SpO2: 97% (12-18-24 @ 15:23) (97% - 97%)    Orthostatic VS  12-19-24 @ 08:22  Lying BP: --/-- HR: --  Sitting BP: 103/69 HR: 78  Standing BP: 108/85 HR: 85  Site: --  Mode: --  Orthostatic VS  12-18-24 @ 18:43  Lying BP: --/-- HR: --  Sitting BP: 130/62 HR: 75  Standing BP: 124/68 HR: 84  Site: --  Mode: --    Lipid Panel:  BMI: BMI (kg/m2): 25.5 (12-18-24 @ 18:43)  HbA1c:   Glucose:   BP: --Vital Signs Last 24 Hrs  T(C): 36.6 (12-19-24 @ 08:22), Max: 36.7 (12-18-24 @ 18:43)  T(F): 97.9 (12-19-24 @ 08:22), Max: 98 (12-18-24 @ 18:43)  HR: --  BP: --  BP(mean): --  RR: --  SpO2: --    Orthostatic VS  12-19-24 @ 08:22  Lying BP: --/-- HR: --  Sitting BP: 103/69 HR: 78  Standing BP: 108/85 HR: 85  Site: --  Mode: --  Orthostatic VS  12-18-24 @ 18:43  Lying BP: --/-- HR: --  Sitting BP: 130/62 HR: 75  Standing BP: 124/68 HR: 84  Site: --  Mode: --    Lipid Panel:

## 2024-12-20 PROCEDURE — 99232 SBSQ HOSP IP/OBS MODERATE 35: CPT

## 2024-12-20 RX ADMIN — RISPERIDONE 2 MILLIGRAM(S): 0.5 TABLET ORAL at 20:14

## 2024-12-20 RX ADMIN — LORAZEPAM 2 MILLIGRAM(S): 1 TABLET ORAL at 23:03

## 2024-12-20 NOTE — BH INPATIENT PSYCHIATRY PROGRESS NOTE - CURRENT MEDICATION
MEDICATIONS  (STANDING):  risperiDONE   Tablet 2 milliGRAM(s) Oral at bedtime    MEDICATIONS  (PRN):  haloperidol     Tablet 5 milliGRAM(s) Oral every 6 hours PRN agitation  haloperidol    Injectable 5 milliGRAM(s) IntraMuscular once PRN severe agitation  LORazepam     Tablet 2 milliGRAM(s) Oral every 6 hours PRN agitation  LORazepam   Injectable 2 milliGRAM(s) IntraMuscular once PRN severe agitation   MEDICATIONS  (STANDING):  risperiDONE   Tablet 2 milliGRAM(s) Oral at bedtime    MEDICATIONS  (PRN):  haloperidol     Tablet 5 milliGRAM(s) Oral every 6 hours PRN agitation  haloperidol    Injectable 5 milliGRAM(s) IntraMuscular once PRN severe agitation  LORazepam     Tablet 2 milliGRAM(s) Oral every 6 hours PRN Agitation  LORazepam   Injectable 2 milliGRAM(s) IntraMuscular once PRN Severe agitation

## 2024-12-20 NOTE — BH INPATIENT PSYCHIATRY PROGRESS NOTE - NSBHCHARTREVIEWVS_PSY_A_CORE FT
Vital Signs Last 24 Hrs  T(C): 36.5 (12-20-24 @ 08:44), Max: 36.5 (12-20-24 @ 08:44)  T(F): 97.7 (12-20-24 @ 08:44), Max: 97.7 (12-20-24 @ 08:44)  HR: --  BP: --  BP(mean): --  RR: --  SpO2: --    Orthostatic VS  12-20-24 @ 08:44  Lying BP: --/-- HR: --  Sitting BP: 105/70 HR: 58  Standing BP: 123/85 HR: 91  Site: upper left arm  Mode: electronic  Orthostatic VS  12-19-24 @ 19:36  Lying BP: --/-- HR: --  Sitting BP: 126/63 HR: 67  Standing BP: 125/81 HR: 69  Site: --  Mode: --  Orthostatic VS  12-19-24 @ 08:22  Lying BP: --/-- HR: --  Sitting BP: 103/69 HR: 78  Standing BP: 108/85 HR: 85  Site: --  Mode: --  Orthostatic VS  12-18-24 @ 18:43  Lying BP: --/-- HR: --  Sitting BP: 130/62 HR: 75  Standing BP: 124/68 HR: 84  Site: --  Mode: --   Vital Signs Last 24 Hrs  T(C): 36.4 (12-23-24 @ 08:29), Max: 36.7 (12-22-24 @ 19:38)  T(F): 97.6 (12-23-24 @ 08:29), Max: 98 (12-22-24 @ 19:38)  HR: --  BP: --  BP(mean): --  RR: --  SpO2: --    Orthostatic VS  12-23-24 @ 08:29  Lying BP: --/-- HR: --  Sitting BP: 111/76 HR: 61  Standing BP: 115/72 HR: 68  Site: --  Mode: --  Orthostatic VS  12-22-24 @ 19:38  Lying BP: --/-- HR: --  Sitting BP: 120/76 HR: 77  Standing BP: 122/71 HR: 82  Site: --  Mode: --  Orthostatic VS  12-22-24 @ 08:40  Lying BP: --/-- HR: --  Sitting BP: 119/76 HR: 72  Standing BP: 118/79 HR: 93  Site: --  Mode: electronic  Orthostatic VS  12-21-24 @ 19:25  Lying BP: --/-- HR: --  Sitting BP: 145/96 HR: 76  Standing BP: 128/64 HR: 87  Site: --  Mode: --

## 2024-12-20 NOTE — BH INPATIENT PSYCHIATRY PROGRESS NOTE - NSBHMETABOLIC_PSY_ALL_CORE_FT
BMI: BMI (kg/m2): 25.5 (12-18-24 @ 18:43)  HbA1c:   Glucose:   BP: --Vital Signs Last 24 Hrs  T(C): 36.5 (12-20-24 @ 08:44), Max: 36.5 (12-20-24 @ 08:44)  T(F): 97.7 (12-20-24 @ 08:44), Max: 97.7 (12-20-24 @ 08:44)  HR: --  BP: --  BP(mean): --  RR: --  SpO2: --    Orthostatic VS  12-20-24 @ 08:44  Lying BP: --/-- HR: --  Sitting BP: 105/70 HR: 58  Standing BP: 123/85 HR: 91  Site: upper left arm  Mode: electronic  Orthostatic VS  12-19-24 @ 19:36  Lying BP: --/-- HR: --  Sitting BP: 126/63 HR: 67  Standing BP: 125/81 HR: 69  Site: --  Mode: --  Orthostatic VS  12-19-24 @ 08:22  Lying BP: --/-- HR: --  Sitting BP: 103/69 HR: 78  Standing BP: 108/85 HR: 85  Site: --  Mode: --  Orthostatic VS  12-18-24 @ 18:43  Lying BP: --/-- HR: --  Sitting BP: 130/62 HR: 75  Standing BP: 124/68 HR: 84  Site: --  Mode: --    Lipid Panel:  BMI: BMI (kg/m2): 25.5 (12-18-24 @ 18:43)  HbA1c:   Glucose:   BP: --Vital Signs Last 24 Hrs  T(C): 36.4 (12-23-24 @ 08:29), Max: 36.7 (12-22-24 @ 19:38)  T(F): 97.6 (12-23-24 @ 08:29), Max: 98 (12-22-24 @ 19:38)  HR: --  BP: --  BP(mean): --  RR: --  SpO2: --    Orthostatic VS  12-23-24 @ 08:29  Lying BP: --/-- HR: --  Sitting BP: 111/76 HR: 61  Standing BP: 115/72 HR: 68  Site: --  Mode: --  Orthostatic VS  12-22-24 @ 19:38  Lying BP: --/-- HR: --  Sitting BP: 120/76 HR: 77  Standing BP: 122/71 HR: 82  Site: --  Mode: --  Orthostatic VS  12-22-24 @ 08:40  Lying BP: --/-- HR: --  Sitting BP: 119/76 HR: 72  Standing BP: 118/79 HR: 93  Site: --  Mode: electronic  Orthostatic VS  12-21-24 @ 19:25  Lying BP: --/-- HR: --  Sitting BP: 145/96 HR: 76  Standing BP: 128/64 HR: 87  Site: --  Mode: --    Lipid Panel:

## 2024-12-20 NOTE — BH INPATIENT PSYCHIATRY PROGRESS NOTE - NSBHFUPINTERVALHXFT_PSY_A_CORE
Chart and history reviewed and discussed with treatment team. No events reported overnight. Seen with SW. Pt guarded on approach, minimizing events leading to hospitalization. Pt states that he did not want to take the injection though unable to state why other that "it's too much." Pt unsure when last AGUIRRE was due or what dose. Pt states that he becomes agitated because of his mother, though declines to state exactly why. pt in agreement to continue oral risperidal at this time.  No behavioral issues noted. Medication compliant, tolerating well, free of EPS. Sleep and appetite maintained. Denies SI/HI/AVH. Continue to monitor for safety.     NP called [facility] to obtain invega sustenna dose and administration date though was unable to speak directly with anyone.   NP and SW left message with pt's mother.

## 2024-12-20 NOTE — BH INPATIENT PSYCHIATRY PROGRESS NOTE - NSBHASSESSSUMMFT_PSY_ALL_CORE
Patient is a 25yo man who lives with his mother, is recently unemployed (fired from job at [employer] 3 weeks ago due to verbal altercation with other employee), with PPHx schizophrenia (vs substance induced psychosis?), two prior hospitalizations at [facility], no prior suicide attempts, +hx of violence and aggression at home, recent cannabis use admitted for increasing aggressive behavior at home and refusing to attend outpt appt at [facility] (refused Invega Sustenna).    At present pt without acute mood or psychotic symptoms, though further evaluation and monitoring is necessary. Recent agitation and aggressive behavior (with property destruction) may be related to psychosis vs marijuana use vs behavioral in nature (r/o ODD, cluster b personality pathology). Pt currently calm and cooperative on the unit. Denies SIIP and HIIP.     Plan:  - Admit to ML4, 9.27 legal status  - Routine checks  - Unclear dose and last administration of monthly Invega Sustenna, will seek collateral from [facility] (unable to reach today), for now will continue pt on PO Risperdal 2mg qhs  - Ativan 2mg and Haldol 5mg PO/IM prn agitation   - Admission labs and EKG reviewed and within normal limits (utox THC+)  - Will continue to provide updates to pt's mother  - Pending collateral from [facility]

## 2024-12-20 NOTE — BH INPATIENT PSYCHIATRY PROGRESS NOTE - PRN MEDS
MEDICATIONS  (PRN):  haloperidol     Tablet 5 milliGRAM(s) Oral every 6 hours PRN agitation  haloperidol    Injectable 5 milliGRAM(s) IntraMuscular once PRN severe agitation  LORazepam     Tablet 2 milliGRAM(s) Oral every 6 hours PRN agitation  LORazepam   Injectable 2 milliGRAM(s) IntraMuscular once PRN severe agitation   MEDICATIONS  (PRN):  haloperidol     Tablet 5 milliGRAM(s) Oral every 6 hours PRN agitation  haloperidol    Injectable 5 milliGRAM(s) IntraMuscular once PRN severe agitation  LORazepam     Tablet 2 milliGRAM(s) Oral every 6 hours PRN Agitation  LORazepam   Injectable 2 milliGRAM(s) IntraMuscular once PRN Severe agitation

## 2024-12-21 PROCEDURE — 99232 SBSQ HOSP IP/OBS MODERATE 35: CPT

## 2024-12-21 RX ADMIN — RISPERIDONE 2 MILLIGRAM(S): 0.5 TABLET ORAL at 20:33

## 2024-12-21 RX ADMIN — LORAZEPAM 2 MILLIGRAM(S): 1 TABLET ORAL at 21:37

## 2024-12-21 NOTE — BH INPATIENT PSYCHIATRY PROGRESS NOTE - CURRENT MEDICATION
MEDICATIONS  (STANDING):  risperiDONE   Tablet 2 milliGRAM(s) Oral at bedtime    MEDICATIONS  (PRN):  haloperidol     Tablet 5 milliGRAM(s) Oral every 6 hours PRN agitation  haloperidol    Injectable 5 milliGRAM(s) IntraMuscular once PRN severe agitation  LORazepam     Tablet 2 milliGRAM(s) Oral every 6 hours PRN agitation  LORazepam   Injectable 2 milliGRAM(s) IntraMuscular once PRN severe agitation

## 2024-12-21 NOTE — BH INPATIENT PSYCHIATRY PROGRESS NOTE - PRN MEDS
MEDICATIONS  (PRN):  haloperidol     Tablet 5 milliGRAM(s) Oral every 6 hours PRN agitation  haloperidol    Injectable 5 milliGRAM(s) IntraMuscular once PRN severe agitation  LORazepam     Tablet 2 milliGRAM(s) Oral every 6 hours PRN agitation  LORazepam   Injectable 2 milliGRAM(s) IntraMuscular once PRN severe agitation

## 2024-12-21 NOTE — BH INPATIENT PSYCHIATRY PROGRESS NOTE - NSBHASSESSSUMMFT_PSY_ALL_CORE
Patient is a 25yo man who lives with his mother, is recently unemployed (fired from job at [employer] 3 weeks ago due to verbal altercation with other employee), with PPHx schizophrenia (vs substance induced psychosis?), two prior hospitalizations at [facility], no prior suicide attempts, +hx of violence and aggression at home, recent cannabis use admitted for increasing aggressive behavior at home and refusing to attend outpt appt at [facility] (refused Invega Sustenna).    At present pt without acute mood or psychotic symptoms, though further evaluation and monitoring is necessary. Recent agitation and aggressive behavior (with property destruction) may be related to psychosis vs marijuana use vs behavioral in nature (r/o ODD, cluster b personality pathology). Pt currently calm and cooperative on the unit. Denies SIIP and HIIP.     Plan:  - Admit to ML4, 9.27 legal status  - Routine checks  - Unclear dose and last administration of monthly Invega Sustenna, will seek collateral from [facility] (unable to reach today), for now will continue pt on PO Risperdal 2mg qhs  - Ativan 2mg and Haldol 5mg PO/IM prn agitation   - Admission labs and EKG reviewed and within normal limits (utox THC+)  - Will continue to provide updates to pt's mother  - Pending collateral from [facility]

## 2024-12-21 NOTE — BH INPATIENT PSYCHIATRY PROGRESS NOTE - NSBHFUPINTERVALHXFT_PSY_A_CORE
Patient was seen and evaluated for increasing aggression at home.  Chart, medications and labs reviewed. Case discussed with nursing team.   Per nursing report no interval events.  No eating or sleeping disturbances.  Patient compliant with medications, no SE reported. Patient remains in fair behavioral control, no prns for aggression.    Patient is observed in his room.  He presents calm and cooperative.  He reports feeling “ok   he denies SI/SIB/HI, no plan or intent on unit and engages in safety plan.  Patient reports he was admitted due to having an argument with his mother. Patient offers no new complaints.   Denies AVH, delusions, or paranoia. No acute medical concerns. Denies pain or discomfort.  Continue to monitor and provide therapeutic support.

## 2024-12-21 NOTE — BH INPATIENT PSYCHIATRY PROGRESS NOTE - NSBHMETABOLIC_PSY_ALL_CORE_FT
BMI: BMI (kg/m2): 25.5 (12-18-24 @ 18:43)  HbA1c:   Glucose:   BP: --Vital Signs Last 24 Hrs  T(C): 36.4 (12-20-24 @ 19:24), Max: 36.5 (12-20-24 @ 08:44)  T(F): 97.6 (12-20-24 @ 19:24), Max: 97.7 (12-20-24 @ 08:44)  HR: --  BP: --  BP(mean): --  RR: --  SpO2: --    Orthostatic VS  12-20-24 @ 19:24  Lying BP: --/-- HR: --  Sitting BP: 148/83 HR: 78  Standing BP: 134/80 HR: 84  Site: --  Mode: --  Orthostatic VS  12-20-24 @ 08:44  Lying BP: --/-- HR: --  Sitting BP: 105/70 HR: 58  Standing BP: 123/85 HR: 91  Site: upper left arm  Mode: electronic  Orthostatic VS  12-19-24 @ 19:36  Lying BP: --/-- HR: --  Sitting BP: 126/63 HR: 67  Standing BP: 125/81 HR: 69  Site: --  Mode: --  Orthostatic VS  12-19-24 @ 08:22  Lying BP: --/-- HR: --  Sitting BP: 103/69 HR: 78  Standing BP: 108/85 HR: 85  Site: --  Mode: --    Lipid Panel:  BMI: BMI (kg/m2): 25.5 (12-18-24 @ 18:43)  HbA1c:   Glucose:   BP: --Vital Signs Last 24 Hrs  T(C): 36.9 (12-21-24 @ 08:22), Max: 36.9 (12-21-24 @ 08:22)  T(F): 98.4 (12-21-24 @ 08:22), Max: 98.4 (12-21-24 @ 08:22)  HR: --  BP: --  BP(mean): --  RR: --  SpO2: --    Orthostatic VS  12-21-24 @ 08:22  Lying BP: --/-- HR: --  Sitting BP: 112/65 HR: 76  Standing BP: 106/67 HR: 91  Site: --  Mode: --  Orthostatic VS  12-20-24 @ 19:24  Lying BP: --/-- HR: --  Sitting BP: 148/83 HR: 78  Standing BP: 134/80 HR: 84  Site: --  Mode: --  Orthostatic VS  12-20-24 @ 08:44  Lying BP: --/-- HR: --  Sitting BP: 105/70 HR: 58  Standing BP: 123/85 HR: 91  Site: upper left arm  Mode: electronic  Orthostatic VS  12-19-24 @ 19:36  Lying BP: --/-- HR: --  Sitting BP: 126/63 HR: 67  Standing BP: 125/81 HR: 69  Site: --  Mode: --    Lipid Panel:

## 2024-12-21 NOTE — BH INPATIENT PSYCHIATRY PROGRESS NOTE - NSBHCHARTREVIEWVS_PSY_A_CORE FT
Vital Signs Last 24 Hrs  T(C): 36.4 (12-20-24 @ 19:24), Max: 36.5 (12-20-24 @ 08:44)  T(F): 97.6 (12-20-24 @ 19:24), Max: 97.7 (12-20-24 @ 08:44)  HR: --  BP: --  BP(mean): --  RR: --  SpO2: --    Orthostatic VS  12-20-24 @ 19:24  Lying BP: --/-- HR: --  Sitting BP: 148/83 HR: 78  Standing BP: 134/80 HR: 84  Site: --  Mode: --  Orthostatic VS  12-20-24 @ 08:44  Lying BP: --/-- HR: --  Sitting BP: 105/70 HR: 58  Standing BP: 123/85 HR: 91  Site: upper left arm  Mode: electronic  Orthostatic VS  12-19-24 @ 19:36  Lying BP: --/-- HR: --  Sitting BP: 126/63 HR: 67  Standing BP: 125/81 HR: 69  Site: --  Mode: --  Orthostatic VS  12-19-24 @ 08:22  Lying BP: --/-- HR: --  Sitting BP: 103/69 HR: 78  Standing BP: 108/85 HR: 85  Site: --  Mode: --   Vital Signs Last 24 Hrs  T(C): 36.9 (12-21-24 @ 08:22), Max: 36.9 (12-21-24 @ 08:22)  T(F): 98.4 (12-21-24 @ 08:22), Max: 98.4 (12-21-24 @ 08:22)  HR: --  BP: --  BP(mean): --  RR: --  SpO2: --    Orthostatic VS  12-21-24 @ 08:22  Lying BP: --/-- HR: --  Sitting BP: 112/65 HR: 76  Standing BP: 106/67 HR: 91  Site: --  Mode: --  Orthostatic VS  12-20-24 @ 19:24  Lying BP: --/-- HR: --  Sitting BP: 148/83 HR: 78  Standing BP: 134/80 HR: 84  Site: --  Mode: --  Orthostatic VS  12-20-24 @ 08:44  Lying BP: --/-- HR: --  Sitting BP: 105/70 HR: 58  Standing BP: 123/85 HR: 91  Site: upper left arm  Mode: electronic  Orthostatic VS  12-19-24 @ 19:36  Lying BP: --/-- HR: --  Sitting BP: 126/63 HR: 67  Standing BP: 125/81 HR: 69  Site: --  Mode: --

## 2024-12-22 PROCEDURE — 99232 SBSQ HOSP IP/OBS MODERATE 35: CPT

## 2024-12-22 RX ADMIN — LORAZEPAM 2 MILLIGRAM(S): 1 TABLET ORAL at 20:27

## 2024-12-22 RX ADMIN — RISPERIDONE 2 MILLIGRAM(S): 0.5 TABLET ORAL at 20:27

## 2024-12-22 NOTE — BH INPATIENT PSYCHIATRY PROGRESS NOTE - NSBHCHARTREVIEWVS_PSY_A_CORE FT
Vital Signs Last 24 Hrs  T(C): 36.2 (12-22-24 @ 08:40), Max: 36.2 (12-21-24 @ 19:25)  T(F): 97.1 (12-22-24 @ 08:40), Max: 97.2 (12-21-24 @ 19:25)  HR: --  BP: --  BP(mean): --  RR: --  SpO2: --    Orthostatic VS  12-22-24 @ 08:40  Lying BP: --/-- HR: --  Sitting BP: 119/76 HR: 72  Standing BP: 118/79 HR: 93  Site: --  Mode: electronic  Orthostatic VS  12-21-24 @ 19:25  Lying BP: --/-- HR: --  Sitting BP: 145/96 HR: 76  Standing BP: 128/64 HR: 87  Site: --  Mode: --  Orthostatic VS  12-21-24 @ 08:22  Lying BP: --/-- HR: --  Sitting BP: 112/65 HR: 76  Standing BP: 106/67 HR: 91  Site: --  Mode: --  Orthostatic VS  12-20-24 @ 19:24  Lying BP: --/-- HR: --  Sitting BP: 148/83 HR: 78  Standing BP: 134/80 HR: 84  Site: --  Mode: --

## 2024-12-22 NOTE — BH INPATIENT PSYCHIATRY PROGRESS NOTE - NSBHFUPINTERVALHXFT_PSY_A_CORE
Patient was seen and evaluated for increasing aggression at home.  Chart, medications and labs reviewed. Case discussed with nursing team.   Per nursing report no interval events.  Appetite and sleep maintained.  Patient compliant with medications, no SE reported. Patient remains in fair behavioral control, no prns for aggression.  Patient is observed in his room.  He presents calm and cooperative.  He reports feeling well overall, “I’m better”.  He denies SI/SIB/HI, no plan or intent on unit and engages in safety plan. Patient offers no new complaints.   Denies AVH, delusions, or paranoia. No acute medical concerns. Denies pain or discomfort.  Continue to monitor and provide therapeutic support.

## 2024-12-22 NOTE — BH INPATIENT PSYCHIATRY PROGRESS NOTE - NSBHASSESSSUMMFT_PSY_ALL_CORE
Patient is a 23yo man who lives with his mother, is recently unemployed (fired from job at [employer] 3 weeks ago due to verbal altercation with other employee), with PPHx schizophrenia (vs substance induced psychosis?), two prior hospitalizations at [facility], no prior suicide attempts, +hx of violence and aggression at home, recent cannabis use admitted for increasing aggressive behavior at home and refusing to attend outpt appt at [facility] (refused Invega Sustenna).    At present pt without acute mood or psychotic symptoms, though further evaluation and monitoring is necessary. Recent agitation and aggressive behavior (with property destruction) may be related to psychosis vs marijuana use vs behavioral in nature (r/o ODD, cluster b personality pathology). Pt currently calm and cooperative on the unit. Denies SIIP and HIIP.     Plan:  - Admit to ML4, 9.27 legal status  - Routine checks  - Unclear dose and last administration of monthly Invega Sustenna, will seek collateral from [facility] (unable to reach today), for now will continue pt on PO Risperdal 2mg qhs  - Ativan 2mg and Haldol 5mg PO/IM prn agitation   - Admission labs and EKG reviewed and within normal limits (utox THC+)  - Will continue to provide updates to pt's mother  - Pending collateral from [facility]

## 2024-12-22 NOTE — BH INPATIENT PSYCHIATRY PROGRESS NOTE - NSBHMETABOLIC_PSY_ALL_CORE_FT
BMI: BMI (kg/m2): 25.5 (12-18-24 @ 18:43)  HbA1c:   Glucose:   BP: --Vital Signs Last 24 Hrs  T(C): 36.2 (12-22-24 @ 08:40), Max: 36.2 (12-21-24 @ 19:25)  T(F): 97.1 (12-22-24 @ 08:40), Max: 97.2 (12-21-24 @ 19:25)  HR: --  BP: --  BP(mean): --  RR: --  SpO2: --    Orthostatic VS  12-22-24 @ 08:40  Lying BP: --/-- HR: --  Sitting BP: 119/76 HR: 72  Standing BP: 118/79 HR: 93  Site: --  Mode: electronic  Orthostatic VS  12-21-24 @ 19:25  Lying BP: --/-- HR: --  Sitting BP: 145/96 HR: 76  Standing BP: 128/64 HR: 87  Site: --  Mode: --  Orthostatic VS  12-21-24 @ 08:22  Lying BP: --/-- HR: --  Sitting BP: 112/65 HR: 76  Standing BP: 106/67 HR: 91  Site: --  Mode: --  Orthostatic VS  12-20-24 @ 19:24  Lying BP: --/-- HR: --  Sitting BP: 148/83 HR: 78  Standing BP: 134/80 HR: 84  Site: --  Mode: --    Lipid Panel:

## 2024-12-23 PROCEDURE — 99232 SBSQ HOSP IP/OBS MODERATE 35: CPT

## 2024-12-23 RX ORDER — LORAZEPAM 1 MG/1
2 TABLET ORAL ONCE
Refills: 0 | Status: DISCONTINUED | OUTPATIENT
Start: 2024-12-23 | End: 2024-12-27

## 2024-12-23 RX ORDER — LORAZEPAM 1 MG/1
2 TABLET ORAL EVERY 6 HOURS
Refills: 0 | Status: DISCONTINUED | OUTPATIENT
Start: 2024-12-23 | End: 2024-12-27

## 2024-12-23 RX ADMIN — RISPERIDONE 2 MILLIGRAM(S): 0.5 TABLET ORAL at 20:51

## 2024-12-23 RX ADMIN — LORAZEPAM 2 MILLIGRAM(S): 1 TABLET ORAL at 20:50

## 2024-12-23 RX ADMIN — HALOPERIDOL DECANOATE 5 MILLIGRAM(S): 50 INJECTION INTRAMUSCULAR at 23:07

## 2024-12-23 NOTE — BH INPATIENT PSYCHIATRY PROGRESS NOTE - NSBHASSESSSUMMFT_PSY_ALL_CORE
Patient is a 23yo man who lives with his mother, is recently unemployed (fired from job at [employer] 3 weeks ago due to verbal altercation with other employee), with PPHx schizophrenia (vs substance induced psychosis?), two prior hospitalizations at [facility], no prior suicide attempts, +hx of violence and aggression at home, recent cannabis use admitted for increasing aggressive behavior at home and refusing to attend outpt appt at [facility] (refused Invega Sustenna).    At present pt without acute mood or psychotic symptoms, though further evaluation and monitoring is necessary. Recent agitation and aggressive behavior (with property destruction) may be related to psychosis vs marijuana use vs behavioral in nature (r/o ODD, cluster b personality pathology). Pt currently calm and cooperative on the unit. Denies SIIP and HIIP.     Plan:  - Admit to ML4, 9.27 legal status  - Routine checks  - Unclear dose and last administration of monthly Invega Sustenna, will seek collateral from [facility] (unable to reach today), for now will continue pt on PO Risperdal 2mg qhs  - Ativan 2mg and Haldol 5mg PO/IM prn agitation   - Admission labs and EKG reviewed and within normal limits (utox THC+)  - Will continue to provide updates to pt's mother  - Pending collateral from [facility]

## 2024-12-23 NOTE — BH INPATIENT PSYCHIATRY PROGRESS NOTE - NSBHFUPINTERVALHXFT_PSY_A_CORE
Patient was seen and evaluated for increasing aggression at home.  Chart, medications and labs reviewed. Case discussed with nursing team, no weekend interval events.  Appetite and sleep maintained.  Patient compliant with medications, no SE reported. Patient remains in fair behavioral control, no prns for aggression.  Patient is observed in his room.  He presents calm and cooperative.  He reports feeling well overall, “I’m better”.  He denies SI/SIB/HI, no plan or intent on unit and engages in safety plan. Patient offers no new complaints.   Denies AVH, delusions, or paranoia. No acute medical concerns. Denies pain or discomfort.  Continue to monitor and provide therapeutic support.     Patient was seen and evaluated for increasing aggression at home.  Chart, medications and labs reviewed. Case discussed with nursing team, no weekend interval events.  Appetite and sleep maintained.  Patient compliant with medications, no SE reported. Patient remains in fair behavioral control, no prns for aggression.  Patient is observed in dayroom.  He presents calm and cooperative.  He reports feeling "ok”.  He denies SI/SIB/HI, no plan or intent on unit and engages in safety plan. Patient offers no new complaints. Primarily focused on being dc.  Pt gave consent to speak to his outpatient psychiatrist at [facility]. Denies AVH, delusions, or paranoia. No acute medical concerns. Vitals: 97.6, 117/76, 61.  Denies pain or discomfort.  Continue to monitor and provide therapeutic support.    Addendum: NP called [facility] at [phone]. Patient does not have a therapist only a psychiatrist.  Patient's outpatient psychiatrist Dr Fabio Mir (ext [phone]) was not available, this NP left message for Dr Mir to return call. NP spoke with Dr Mir's assistant who confirmed that patient has not been seen since Oct 28th, he missed his last session in November.  Patient is on Invega AGUIRRE 234mg monthly and missed his dosing in November last received on Oct 28th.

## 2024-12-23 NOTE — BH INPATIENT PSYCHIATRY PROGRESS NOTE - NSBHCHARTREVIEWVS_PSY_A_CORE FT
Vital Signs Last 24 Hrs  T(C): 36.7 (12-22-24 @ 19:38), Max: 36.7 (12-22-24 @ 19:38)  T(F): 98 (12-22-24 @ 19:38), Max: 98 (12-22-24 @ 19:38)  HR: --  BP: --  BP(mean): --  RR: 16 (12-22-24 @ 08:40) (16 - 16)  SpO2: --    Orthostatic VS  12-22-24 @ 19:38  Lying BP: --/-- HR: --  Sitting BP: 120/76 HR: 77  Standing BP: 122/71 HR: 82  Site: --  Mode: --  Orthostatic VS  12-22-24 @ 08:40  Lying BP: --/-- HR: --  Sitting BP: 119/76 HR: 72  Standing BP: 118/79 HR: 93  Site: --  Mode: electronic  Orthostatic VS  12-21-24 @ 19:25  Lying BP: --/-- HR: --  Sitting BP: 145/96 HR: 76  Standing BP: 128/64 HR: 87  Site: --  Mode: --  Orthostatic VS  12-21-24 @ 08:22  Lying BP: --/-- HR: --  Sitting BP: 112/65 HR: 76  Standing BP: 106/67 HR: 91  Site: --  Mode: --   Vital Signs Last 24 Hrs  T(C): 36.4 (12-23-24 @ 08:29), Max: 36.7 (12-22-24 @ 19:38)  T(F): 97.6 (12-23-24 @ 08:29), Max: 98 (12-22-24 @ 19:38)  HR: --  BP: --  BP(mean): --  RR: --  SpO2: --    Orthostatic VS  12-23-24 @ 08:29  Lying BP: --/-- HR: --  Sitting BP: 111/76 HR: 61  Standing BP: 115/72 HR: 68  Site: --  Mode: --  Orthostatic VS  12-22-24 @ 19:38  Lying BP: --/-- HR: --  Sitting BP: 120/76 HR: 77  Standing BP: 122/71 HR: 82  Site: --  Mode: --  Orthostatic VS  12-22-24 @ 08:40  Lying BP: --/-- HR: --  Sitting BP: 119/76 HR: 72  Standing BP: 118/79 HR: 93  Site: --  Mode: electronic  Orthostatic VS  12-21-24 @ 19:25  Lying BP: --/-- HR: --  Sitting BP: 145/96 HR: 76  Standing BP: 128/64 HR: 87  Site: --  Mode: --

## 2024-12-23 NOTE — BH INPATIENT PSYCHIATRY PROGRESS NOTE - NSBHMETABOLIC_PSY_ALL_CORE_FT
BMI: BMI (kg/m2): 25.5 (12-18-24 @ 18:43)  HbA1c:   Glucose:   BP: --Vital Signs Last 24 Hrs  T(C): 36.7 (12-22-24 @ 19:38), Max: 36.7 (12-22-24 @ 19:38)  T(F): 98 (12-22-24 @ 19:38), Max: 98 (12-22-24 @ 19:38)  HR: --  BP: --  BP(mean): --  RR: 16 (12-22-24 @ 08:40) (16 - 16)  SpO2: --    Orthostatic VS  12-22-24 @ 19:38  Lying BP: --/-- HR: --  Sitting BP: 120/76 HR: 77  Standing BP: 122/71 HR: 82  Site: --  Mode: --  Orthostatic VS  12-22-24 @ 08:40  Lying BP: --/-- HR: --  Sitting BP: 119/76 HR: 72  Standing BP: 118/79 HR: 93  Site: --  Mode: electronic  Orthostatic VS  12-21-24 @ 19:25  Lying BP: --/-- HR: --  Sitting BP: 145/96 HR: 76  Standing BP: 128/64 HR: 87  Site: --  Mode: --  Orthostatic VS  12-21-24 @ 08:22  Lying BP: --/-- HR: --  Sitting BP: 112/65 HR: 76  Standing BP: 106/67 HR: 91  Site: --  Mode: --    Lipid Panel:  BMI: BMI (kg/m2): 25.5 (12-18-24 @ 18:43)  HbA1c:   Glucose:   BP: --Vital Signs Last 24 Hrs  T(C): 36.4 (12-23-24 @ 08:29), Max: 36.7 (12-22-24 @ 19:38)  T(F): 97.6 (12-23-24 @ 08:29), Max: 98 (12-22-24 @ 19:38)  HR: --  BP: --  BP(mean): --  RR: --  SpO2: --    Orthostatic VS  12-23-24 @ 08:29  Lying BP: --/-- HR: --  Sitting BP: 111/76 HR: 61  Standing BP: 115/72 HR: 68  Site: --  Mode: --  Orthostatic VS  12-22-24 @ 19:38  Lying BP: --/-- HR: --  Sitting BP: 120/76 HR: 77  Standing BP: 122/71 HR: 82  Site: --  Mode: --  Orthostatic VS  12-22-24 @ 08:40  Lying BP: --/-- HR: --  Sitting BP: 119/76 HR: 72  Standing BP: 118/79 HR: 93  Site: --  Mode: electronic  Orthostatic VS  12-21-24 @ 19:25  Lying BP: --/-- HR: --  Sitting BP: 145/96 HR: 76  Standing BP: 128/64 HR: 87  Site: --  Mode: --    Lipid Panel:

## 2024-12-24 PROCEDURE — 99232 SBSQ HOSP IP/OBS MODERATE 35: CPT

## 2024-12-24 RX ORDER — RISPERIDONE 0.5 MG/1
3 TABLET ORAL AT BEDTIME
Refills: 0 | Status: DISCONTINUED | OUTPATIENT
Start: 2024-12-24 | End: 2025-01-02

## 2024-12-24 RX ADMIN — RISPERIDONE 3 MILLIGRAM(S): 0.5 TABLET ORAL at 20:26

## 2024-12-24 RX ADMIN — LORAZEPAM 2 MILLIGRAM(S): 1 TABLET ORAL at 22:19

## 2024-12-24 NOTE — BH INPATIENT PSYCHIATRY PROGRESS NOTE - NSBHCHARTREVIEWVS_PSY_A_CORE FT
Vital Signs Last 24 Hrs  T(C): 36.4 (12-23-24 @ 08:29), Max: 36.4 (12-23-24 @ 08:29)  T(F): 97.6 (12-23-24 @ 08:29), Max: 97.6 (12-23-24 @ 08:29)  HR: --  BP: --  BP(mean): --  RR: 18 (12-23-24 @ 21:03) (16 - 18)  SpO2: --    Orthostatic VS  12-23-24 @ 08:29  Lying BP: --/-- HR: --  Sitting BP: 111/76 HR: 61  Standing BP: 115/72 HR: 68  Site: --  Mode: --  Orthostatic VS  12-22-24 @ 19:38  Lying BP: --/-- HR: --  Sitting BP: 120/76 HR: 77  Standing BP: 122/71 HR: 82  Site: --  Mode: --  Orthostatic VS  12-22-24 @ 08:40  Lying BP: --/-- HR: --  Sitting BP: 119/76 HR: 72  Standing BP: 118/79 HR: 93  Site: --  Mode: electronic   Vital Signs Last 24 Hrs  T(C): 37.1 (12-24-24 @ 08:17), Max: 37.1 (12-24-24 @ 08:17)  T(F): 98.8 (12-24-24 @ 08:17), Max: 98.8 (12-24-24 @ 08:17)  HR: --  BP: --  BP(mean): --  RR: 18 (12-23-24 @ 21:03) (18 - 18)  SpO2: --    Orthostatic VS  12-24-24 @ 08:17  Lying BP: --/-- HR: --  Sitting BP: 105/60 HR: 68  Standing BP: 98/65 HR: 90  Site: --  Mode: electronic  Orthostatic VS  12-23-24 @ 08:29  Lying BP: --/-- HR: --  Sitting BP: 111/76 HR: 61  Standing BP: 115/72 HR: 68  Site: --  Mode: --  Orthostatic VS  12-22-24 @ 19:38  Lying BP: --/-- HR: --  Sitting BP: 120/76 HR: 77  Standing BP: 122/71 HR: 82  Site: --  Mode: --

## 2024-12-24 NOTE — BH INPATIENT PSYCHIATRY PROGRESS NOTE - NSBHASSESSSUMMFT_PSY_ALL_CORE
Patient is a 25yo man who lives with his mother, is recently unemployed (fired from job at [employer] 3 weeks ago due to verbal altercation with other employee), with PPHx schizophrenia (vs substance induced psychosis?), two prior hospitalizations at [facility], no prior suicide attempts, +hx of violence and aggression at home, recent cannabis use admitted for increasing aggressive behavior at home and refusing to attend outpt appt at [facility] (refused Invega Sustenna).    At present pt without acute mood or psychotic symptoms, though further evaluation and monitoring is necessary. Recent agitation and aggressive behavior (with property destruction) may be related to psychosis vs marijuana use vs behavioral in nature (r/o ODD, cluster b personality pathology). Pt currently calm and cooperative on the unit. Denies SIIP and HIIP.     Plan:  - Admit to ML4, 9.27 legal status  - Routine checks  - Unclear dose and last administration of monthly Invega Sustenna, will seek collateral from [facility] (unable to reach today), for now will continue pt on PO Risperdal 2mg qhs, titrate 3mg on 12/24  - Ativan 2mg and Haldol 5mg PO/IM prn agitation   - Admission labs and EKG reviewed and within normal limits (utox THC+)  - Will continue to provide updates to pt's mother  - Pending collateral from [facility]    Patient is a 25yo man who lives with his mother, is recently unemployed (fired from job at [employer] 3 weeks ago due to verbal altercation with other employee), with PPHx schizophrenia (vs substance induced psychosis?), two prior hospitalizations at [facility], no prior suicide attempts, +hx of violence and aggression at home, recent cannabis use admitted for increasing aggressive behavior at home and refusing to attend outpt appt at [facility] (refused Invega Sustenna).    At present pt without acute mood or psychotic symptoms, though further evaluation and monitoring is necessary. Recent agitation and aggressive behavior (with property destruction) may be related to psychosis vs marijuana use vs behavioral in nature (r/o ODD, cluster b personality pathology). Pt currently calm and cooperative on the unit. Denies SIIP and HIIP.     Plan:  - Admit to ML4, 9.27 legal status  - Routine checks  - Unclear dose and last administration of monthly Invega Sustenna, will seek collateral from [facility] (unable to reach today), for now will continue pt on Titrate Risperdal 3mg qhs, titrate 3mg on 12/24  - Ativan 2mg and Haldol 5mg PO/IM prn agitation   - Admission labs and EKG reviewed and within normal limits (utox THC+)  - Will continue to provide updates to pt's mother  - Pending collateral from [facility]

## 2024-12-24 NOTE — BH INPATIENT PSYCHIATRY PROGRESS NOTE - NSBHMETABOLIC_PSY_ALL_CORE_FT
BMI: BMI (kg/m2): 25.5 (12-18-24 @ 18:43)  HbA1c:   Glucose:   BP: --Vital Signs Last 24 Hrs  T(C): 36.4 (12-23-24 @ 08:29), Max: 36.4 (12-23-24 @ 08:29)  T(F): 97.6 (12-23-24 @ 08:29), Max: 97.6 (12-23-24 @ 08:29)  HR: --  BP: --  BP(mean): --  RR: 18 (12-23-24 @ 21:03) (16 - 18)  SpO2: --    Orthostatic VS  12-23-24 @ 08:29  Lying BP: --/-- HR: --  Sitting BP: 111/76 HR: 61  Standing BP: 115/72 HR: 68  Site: --  Mode: --  Orthostatic VS  12-22-24 @ 19:38  Lying BP: --/-- HR: --  Sitting BP: 120/76 HR: 77  Standing BP: 122/71 HR: 82  Site: --  Mode: --  Orthostatic VS  12-22-24 @ 08:40  Lying BP: --/-- HR: --  Sitting BP: 119/76 HR: 72  Standing BP: 118/79 HR: 93  Site: --  Mode: electronic    Lipid Panel:  BMI: BMI (kg/m2): 25.5 (12-18-24 @ 18:43)  HbA1c:   Glucose:   BP: --Vital Signs Last 24 Hrs  T(C): 37.1 (12-24-24 @ 08:17), Max: 37.1 (12-24-24 @ 08:17)  T(F): 98.8 (12-24-24 @ 08:17), Max: 98.8 (12-24-24 @ 08:17)  HR: --  BP: --  BP(mean): --  RR: 18 (12-23-24 @ 21:03) (18 - 18)  SpO2: --    Orthostatic VS  12-24-24 @ 08:17  Lying BP: --/-- HR: --  Sitting BP: 105/60 HR: 68  Standing BP: 98/65 HR: 90  Site: --  Mode: electronic  Orthostatic VS  12-23-24 @ 08:29  Lying BP: --/-- HR: --  Sitting BP: 111/76 HR: 61  Standing BP: 115/72 HR: 68  Site: --  Mode: --  Orthostatic VS  12-22-24 @ 19:38  Lying BP: --/-- HR: --  Sitting BP: 120/76 HR: 77  Standing BP: 122/71 HR: 82  Site: --  Mode: --    Lipid Panel:

## 2024-12-24 NOTE — BH INPATIENT PSYCHIATRY PROGRESS NOTE - CURRENT MEDICATION
MEDICATIONS  (STANDING):  risperiDONE   Tablet 3 milliGRAM(s) Oral at bedtime    MEDICATIONS  (PRN):  haloperidol     Tablet 5 milliGRAM(s) Oral every 6 hours PRN agitation  haloperidol    Injectable 5 milliGRAM(s) IntraMuscular once PRN severe agitation  LORazepam     Tablet 2 milliGRAM(s) Oral every 6 hours PRN Agitation  LORazepam   Injectable 2 milliGRAM(s) IntraMuscular once PRN Severe agitation

## 2024-12-24 NOTE — BH INPATIENT PSYCHIATRY PROGRESS NOTE - MSE UNSTRUCTURED FT
Pt is a 25yo man with fair grooming and hygiene. He is calm and cooperative with appropriate eye contact. No psychomotor abnormalities noted. Speech is normal in rate and volume, spontaneous. Stated mood is "fine." Affect is mostly euthymic, irritable edge, constricted. TP is linear. TC without evidence of layne delusions, denies SIIP and HIIP, focused on discharge. No evidence of perceptual disturbance at this time. Insight and judgement are limited. Impulse control is currently intact though recently tenuous. 
Pt is a 23yo man with fair grooming and hygiene. He is calm and cooperative with appropriate eye contact. No psychomotor abnormalities noted. Speech is normal in rate and volume, spontaneous. Stated mood is "fine." Affect is mostly euthymic, irritable edge, constricted. TP is linear. TC without evidence of layne delusions, denies SIIP and HIIP, focused on discharge. No evidence of perceptual disturbance at this time. Insight and judgement are limited. Impulse control is currently intact though recently tenuous. 
Pt is a 25yo man with fair grooming and hygiene. He is calm and cooperative with appropriate eye contact. No psychomotor abnormalities noted. Speech is normal in rate and volume, spontaneous. Stated mood is "fine." Affect is mostly euthymic, irritable edge, constricted. TP is linear. TC without evidence of layne delusions, denies SIIP and HIIP, focused on discharge. No evidence of perceptual disturbance at this time. Insight and judgement are limited. Impulse control is currently intact though recently tenuous.

## 2024-12-24 NOTE — BH INPATIENT PSYCHIATRY PROGRESS NOTE - NSBHFUPINTERVALHXFT_PSY_A_CORE
Patient was seen and evaluated for increasing aggression at home.  Chart, medications and labs reviewed. Case discussed with nursing team, no weekend interval events.  Appetite and sleep maintained.  Patient compliant with medications, no SE reported. Titrate Risperdal 3mg qhs.  Patient remains in fair behavioral control, stays mostly in his room  no prns for aggression. He reports feeling "ok” minimally engaging.  He denies SI/SIB/HI, no plan or intent on unit and engages in safety plan. Patient offers no new complaints. Primarily focused on being dc.  Denies AVH, delusions, or paranoia. No acute medical concerns. Denies pain or discomfort.  Continue to monitor and provide therapeutic support.  Addendum: NP called [facility] at [phone]. Patient does not have a therapist only a psychiatrist.  Patient's outpatient psychiatrist Dr Fabio Mir (ext [phone]) was not available, this NP left message for Dr Mir to return call. NP spoke with Dr Mir's assistant who confirmed that patient has not been seen since Oct 28th, he missed his last session in November.  Patient is on Invega AGUIRRE 234mg monthly and missed his dosing in November last received on Oct 28th.

## 2024-12-25 RX ORDER — ONDANSETRON 4 MG/1
4 TABLET ORAL ONCE
Refills: 0 | Status: COMPLETED | OUTPATIENT
Start: 2024-12-25 | End: 2024-12-25

## 2024-12-25 RX ADMIN — ONDANSETRON 4 MILLIGRAM(S): 4 TABLET ORAL at 23:20

## 2024-12-25 RX ADMIN — RISPERIDONE 3 MILLIGRAM(S): 0.5 TABLET ORAL at 21:30

## 2024-12-25 RX ADMIN — LORAZEPAM 2 MILLIGRAM(S): 1 TABLET ORAL at 23:18

## 2024-12-26 LAB
FLUAV AG NPH QL: SIGNIFICANT CHANGE UP
FLUBV AG NPH QL: SIGNIFICANT CHANGE UP
RSV RNA NPH QL NAA+NON-PROBE: SIGNIFICANT CHANGE UP
SARS-COV-2 RNA SPEC QL NAA+PROBE: SIGNIFICANT CHANGE UP

## 2024-12-26 PROCEDURE — 99232 SBSQ HOSP IP/OBS MODERATE 35: CPT

## 2024-12-26 RX ORDER — MAG HYDROX/ALUMINUM HYD/SIMETH 200-200-20
30 SUSPENSION, ORAL (FINAL DOSE FORM) ORAL EVERY 6 HOURS
Refills: 0 | Status: DISCONTINUED | OUTPATIENT
Start: 2024-12-26 | End: 2025-01-07

## 2024-12-26 RX ADMIN — LORAZEPAM 2 MILLIGRAM(S): 1 TABLET ORAL at 21:25

## 2024-12-26 RX ADMIN — RISPERIDONE 3 MILLIGRAM(S): 0.5 TABLET ORAL at 20:38

## 2024-12-26 NOTE — BH TREATMENT PLAN - NSTXCOPEINTERRN_PSY_ALL_CORE
Assess current coping skills  Assess readiness to learn  Encourage daily participation in daily psychoeducational groups and activities   Monitor medication compliance and response
Assess current coping skills  Assess readiness to learn  Encourage daily participation in daily psychoeducational groups and activities   Monitor medication compliance and response

## 2024-12-26 NOTE — BH TREATMENT PLAN - NSTXPSYCHOINTERRN_PSY_ALL_CORE
Assess thought process daily  Monitor medication compliance and response  Maintain reality orientation daily   Maintain safe and therapeutic process  If command hallucinations present, assess for content and direction
Assess thought process daily  Monitor medication compliance and response  Maintain reality orientation daily   Maintain safe and therapeutic process  If command hallucinations present, assess for content and direction

## 2024-12-26 NOTE — BH TREATMENT PLAN - NSCMSPTSTRENGTHS_PSY_ALL_CORE
Future/goal oriented/Leisure interest/Supportive family
Future/goal oriented/Leisure interest/Supportive family

## 2024-12-26 NOTE — BH INPATIENT PSYCHIATRY PROGRESS NOTE - NSBHCHARTREVIEWVS_PSY_A_CORE FT
Vital Signs Last 24 Hrs  T(C): 36.5 (12-25-24 @ 19:17), Max: 36.5 (12-25-24 @ 19:17)  T(F): 97.7 (12-25-24 @ 19:17), Max: 97.7 (12-25-24 @ 19:17)  HR: --  BP: --  BP(mean): --  RR: 16 (12-25-24 @ 08:31) (16 - 16)  SpO2: --    Orthostatic VS  12-25-24 @ 19:17  Lying BP: --/-- HR: --  Sitting BP: 117/78 HR: 87  Standing BP: 125/75 HR: 94  Site: --  Mode: --  Orthostatic VS  12-25-24 @ 08:31  Lying BP: --/-- HR: --  Sitting BP: 111/66 HR: 67  Standing BP: 119/74 HR: 77  Site: --  Mode: --  Orthostatic VS  12-24-24 @ 19:22  Lying BP: --/-- HR: --  Sitting BP: 120/62 HR: 73  Standing BP: 117/61 HR: 83  Site: --  Mode: --  Orthostatic VS  12-24-24 @ 08:17  Lying BP: --/-- HR: --  Sitting BP: 105/60 HR: 68  Standing BP: 98/65 HR: 90  Site: --  Mode: electronic   Vital Signs Last 24 Hrs  T(C): 37 (12-26-24 @ 20:02), Max: 37 (12-26-24 @ 20:02)  T(F): 98.6 (12-26-24 @ 20:02), Max: 98.6 (12-26-24 @ 20:02)  HR: --  BP: --  BP(mean): --  RR: 18 (12-26-24 @ 21:56) (16 - 18)  SpO2: --    Orthostatic VS  12-26-24 @ 20:02  Lying BP: --/-- HR: --  Sitting BP: 105/87 HR: 110  Standing BP: 123/75 HR: 120  Site: --  Mode: --  Orthostatic VS  12-26-24 @ 08:34  Lying BP: --/-- HR: --  Sitting BP: 104/62 HR: 120  Standing BP: 104/84 HR: 112  Site: upper left arm  Mode: electronic  Orthostatic VS  12-25-24 @ 19:17  Lying BP: --/-- HR: --  Sitting BP: 117/78 HR: 87  Standing BP: 125/75 HR: 94  Site: --  Mode: --  Orthostatic VS  12-25-24 @ 08:31  Lying BP: --/-- HR: --  Sitting BP: 111/66 HR: 67  Standing BP: 119/74 HR: 77  Site: --  Mode: --   Vital Signs Last 24 Hrs  T(C): 36.7 (12-27-24 @ 08:14), Max: 37 (12-26-24 @ 20:02)  T(F): 98.1 (12-27-24 @ 08:14), Max: 98.6 (12-26-24 @ 20:02)  HR: --  BP: --  BP(mean): --  RR: 18 (12-26-24 @ 21:56) (18 - 18)  SpO2: --    Orthostatic VS  12-27-24 @ 08:14  Lying BP: --/-- HR: --  Sitting BP: 109/62 HR: 79  Standing BP: 104/77 HR: 93  Site: --  Mode: --  Orthostatic VS  12-26-24 @ 20:02  Lying BP: --/-- HR: --  Sitting BP: 105/87 HR: 110  Standing BP: 123/75 HR: 120  Site: --  Mode: --  Orthostatic VS  12-26-24 @ 08:34  Lying BP: --/-- HR: --  Sitting BP: 104/62 HR: 120  Standing BP: 104/84 HR: 112  Site: upper left arm  Mode: electronic  Orthostatic VS  12-25-24 @ 19:17  Lying BP: --/-- HR: --  Sitting BP: 117/78 HR: 87  Standing BP: 125/75 HR: 94  Site: --  Mode: --

## 2024-12-26 NOTE — BH TREATMENT PLAN - NSTXPATIENTPARTICIPATE_PSY_ALL_CORE
Patient participated in identification of needs/problems/goals for treatment
Patient participated in identification of needs/problems/goals for treatment/Patient participated in development of after care plan

## 2024-12-26 NOTE — BH TREATMENT PLAN - NSTXMEDICINTERRN_PSY_ALL_CORE
Provide education on medication indications and side effects  Evaluate patient understanding with teachback  Encourage patient to adhere to medication schedule  Encourage patient to verbalize concerns
Provide education on medication indications and side effects  Evaluate patient understanding with teachback  Encourage patient to adhere to medication schedule  Encourage patient to verbalize concerns

## 2024-12-26 NOTE — BH INPATIENT PSYCHIATRY PROGRESS NOTE - PRN MEDS
MEDICATIONS  (PRN):  aluminum hydroxide/magnesium hydroxide/simethicone Suspension 30 milliLiter(s) Oral every 6 hours PRN Dyspepsia  haloperidol     Tablet 5 milliGRAM(s) Oral every 6 hours PRN agitation  haloperidol    Injectable 5 milliGRAM(s) IntraMuscular once PRN severe agitation  LORazepam     Tablet 2 milliGRAM(s) Oral every 6 hours PRN Agitation  LORazepam   Injectable 2 milliGRAM(s) IntraMuscular once PRN Severe agitation   MEDICATIONS  (PRN):  aluminum hydroxide/magnesium hydroxide/simethicone Suspension 30 milliLiter(s) Oral every 6 hours PRN Dyspepsia  haloperidol     Tablet 5 milliGRAM(s) Oral every 6 hours PRN agitation  haloperidol    Injectable 5 milliGRAM(s) IntraMuscular once PRN severe agitation  LORazepam     Tablet 2 milliGRAM(s) Oral every 6 hours PRN Agitation/Anxiety  LORazepam   Injectable 2 milliGRAM(s) IntraMuscular once PRN Severe aggression

## 2024-12-26 NOTE — BH SCALES AND SCREENS - NSAIMSMUSCFACEXP_PSY_ALL_CORE
Epic note informing CM of a [facility] admission for a fall from 4/25 to 4/28 when client was transferred to the 4th floor of [facility] where [facility] has a TCU.  Left message with the .    Call again to nurse on the unit who reports that goal is to increase strength.  Client has pain when he stands.  CM reports that client lays in bed for much of the day due to this back pain.  Nurse took CM contact information.  Said she was unsure if client would be discharged in a few days or a few weeks.     Amanda Coates RN  [facility]   [phone]    
None

## 2024-12-26 NOTE — BH INPATIENT PSYCHIATRY PROGRESS NOTE - NSBHMETABOLIC_PSY_ALL_CORE_FT
BMI: BMI (kg/m2): 25.5 (12-18-24 @ 18:43)  HbA1c:   Glucose:   BP: --Vital Signs Last 24 Hrs  T(C): 36.5 (12-25-24 @ 19:17), Max: 36.5 (12-25-24 @ 19:17)  T(F): 97.7 (12-25-24 @ 19:17), Max: 97.7 (12-25-24 @ 19:17)  HR: --  BP: --  BP(mean): --  RR: 16 (12-25-24 @ 08:31) (16 - 16)  SpO2: --    Orthostatic VS  12-25-24 @ 19:17  Lying BP: --/-- HR: --  Sitting BP: 117/78 HR: 87  Standing BP: 125/75 HR: 94  Site: --  Mode: --  Orthostatic VS  12-25-24 @ 08:31  Lying BP: --/-- HR: --  Sitting BP: 111/66 HR: 67  Standing BP: 119/74 HR: 77  Site: --  Mode: --  Orthostatic VS  12-24-24 @ 19:22  Lying BP: --/-- HR: --  Sitting BP: 120/62 HR: 73  Standing BP: 117/61 HR: 83  Site: --  Mode: --  Orthostatic VS  12-24-24 @ 08:17  Lying BP: --/-- HR: --  Sitting BP: 105/60 HR: 68  Standing BP: 98/65 HR: 90  Site: --  Mode: electronic    Lipid Panel:  BMI: BMI (kg/m2): 25.5 (12-18-24 @ 18:43)  HbA1c:   Glucose:   BP: --Vital Signs Last 24 Hrs  T(C): 37 (12-26-24 @ 20:02), Max: 37 (12-26-24 @ 20:02)  T(F): 98.6 (12-26-24 @ 20:02), Max: 98.6 (12-26-24 @ 20:02)  HR: --  BP: --  BP(mean): --  RR: 18 (12-26-24 @ 21:56) (16 - 18)  SpO2: --    Orthostatic VS  12-26-24 @ 20:02  Lying BP: --/-- HR: --  Sitting BP: 105/87 HR: 110  Standing BP: 123/75 HR: 120  Site: --  Mode: --  Orthostatic VS  12-26-24 @ 08:34  Lying BP: --/-- HR: --  Sitting BP: 104/62 HR: 120  Standing BP: 104/84 HR: 112  Site: upper left arm  Mode: electronic  Orthostatic VS  12-25-24 @ 19:17  Lying BP: --/-- HR: --  Sitting BP: 117/78 HR: 87  Standing BP: 125/75 HR: 94  Site: --  Mode: --  Orthostatic VS  12-25-24 @ 08:31  Lying BP: --/-- HR: --  Sitting BP: 111/66 HR: 67  Standing BP: 119/74 HR: 77  Site: --  Mode: --    Lipid Panel:  BMI: BMI (kg/m2): 25.5 (12-18-24 @ 18:43)  HbA1c:   Glucose:   BP: --Vital Signs Last 24 Hrs  T(C): 36.7 (12-27-24 @ 08:14), Max: 37 (12-26-24 @ 20:02)  T(F): 98.1 (12-27-24 @ 08:14), Max: 98.6 (12-26-24 @ 20:02)  HR: --  BP: --  BP(mean): --  RR: 18 (12-26-24 @ 21:56) (18 - 18)  SpO2: --    Orthostatic VS  12-27-24 @ 08:14  Lying BP: --/-- HR: --  Sitting BP: 109/62 HR: 79  Standing BP: 104/77 HR: 93  Site: --  Mode: --  Orthostatic VS  12-26-24 @ 20:02  Lying BP: --/-- HR: --  Sitting BP: 105/87 HR: 110  Standing BP: 123/75 HR: 120  Site: --  Mode: --  Orthostatic VS  12-26-24 @ 08:34  Lying BP: --/-- HR: --  Sitting BP: 104/62 HR: 120  Standing BP: 104/84 HR: 112  Site: upper left arm  Mode: electronic  Orthostatic VS  12-25-24 @ 19:17  Lying BP: --/-- HR: --  Sitting BP: 117/78 HR: 87  Standing BP: 125/75 HR: 94  Site: --  Mode: --    Lipid Panel:

## 2024-12-26 NOTE — BH INPATIENT PSYCHIATRY PROGRESS NOTE - NSBHASSESSSUMMFT_PSY_ALL_CORE
Patient is a 23yo man who lives with his mother, is recently unemployed (fired from job at [employer] 3 weeks ago due to verbal altercation with other employee), with PPHx schizophrenia (vs substance induced psychosis?), two prior hospitalizations at [facility], no prior suicide attempts, +hx of violence and aggression at home, recent cannabis use admitted for increasing aggressive behavior at home and refusing to attend outpt appt at [facility] (refused Invega Sustenna).    At present pt without acute mood or psychotic symptoms, though further evaluation and monitoring is necessary. Recent agitation and aggressive behavior (with property destruction) may be related to psychosis vs marijuana use vs behavioral in nature (r/o ODD, cluster b personality pathology). Pt currently calm and cooperative on the unit. Denies SIIP and HIIP.     12/26: Tolerating increase in Risperdal 3mg.  Pt compliant with medications.  Mostly stays in his room, occasionally is seen on unit.  No behavioral issues. He denies SI/HI/AVH.     Plan:  - Admit to ML4, 9.27 legal status  - Routine checks  - Unclear dose and last administration of monthly Invega Sustenna, will seek collateral from [facility] (unable to reach today), for now will continue pt on Titrate Risperdal 3mg qhs, titrate 3mg on 12/24  - Ativan 2mg and Haldol 5mg PO/IM prn agitation   - Admission labs and EKG reviewed and within normal limits (utox THC+)  - Will continue to provide updates to pt's mother  - Pending collateral from [facility]

## 2024-12-26 NOTE — BH INPATIENT PSYCHIATRY PROGRESS NOTE - NSBHATTESTTYPEVISIT_PSY_A_CORE
Writer will send patient an e-advice for normal US Aorta results. Patient last logged onto [facility]ito on 12/12/24.   On-site Attending supervising MANDI (99XXX codes) MANDI without on-site Attending supervision

## 2024-12-26 NOTE — BH SCALES AND SCREENS - NSAIMSCURRENTPROB_PSY_ALL_CORE
Take doxycycline as directed for sinus infection and right ear infection.  Take prednisone for your asthma exacerbation.  Continue albuterol inhalers as needed.  Return for reevaluation if you develop any shortness of breath or difficulty breathing.   No

## 2024-12-26 NOTE — BH TREATMENT PLAN - NSTXIMPULSINTERPR_PSY_ALL_CORE
Patient is advised to work on identifying 2 triggers to aggression and agitation so as to increase awareness and avoid acting out. Progress will be measured by the patient’s verbalization of triggers. Patient is recommended to attend 1-2 group sessions on a daily basis as well as 1:1 engagement to optimize chances of successfully reaching treatment goal. Patient is advised to comply with [facility]4 unit structure and communication issues to staff/providers.
Patient is advised to work on identifying 2 triggers to aggression and agitation so as to increase awareness and avoid acting out. Progress will be measured by the patient’s verbalization of triggers. Patient is recommended to attend 1-2 group sessions on a daily basis as well as 1:1 engagement to optimize chances of successfully reaching treatment goal. Patient is advised to comply with ML4 unit structure and communication issues to staff/providers.

## 2024-12-26 NOTE — BH INPATIENT PSYCHIATRY PROGRESS NOTE - CURRENT MEDICATION
MEDICATIONS  (STANDING):  risperiDONE   Tablet 3 milliGRAM(s) Oral at bedtime    MEDICATIONS  (PRN):  aluminum hydroxide/magnesium hydroxide/simethicone Suspension 30 milliLiter(s) Oral every 6 hours PRN Dyspepsia  haloperidol     Tablet 5 milliGRAM(s) Oral every 6 hours PRN agitation  haloperidol    Injectable 5 milliGRAM(s) IntraMuscular once PRN severe agitation  LORazepam     Tablet 2 milliGRAM(s) Oral every 6 hours PRN Agitation  LORazepam   Injectable 2 milliGRAM(s) IntraMuscular once PRN Severe agitation   MEDICATIONS  (STANDING):  risperiDONE   Tablet 3 milliGRAM(s) Oral at bedtime    MEDICATIONS  (PRN):  aluminum hydroxide/magnesium hydroxide/simethicone Suspension 30 milliLiter(s) Oral every 6 hours PRN Dyspepsia  haloperidol     Tablet 5 milliGRAM(s) Oral every 6 hours PRN agitation  haloperidol    Injectable 5 milliGRAM(s) IntraMuscular once PRN severe agitation  LORazepam     Tablet 2 milliGRAM(s) Oral every 6 hours PRN Agitation/Anxiety  LORazepam   Injectable 2 milliGRAM(s) IntraMuscular once PRN Severe aggression

## 2024-12-26 NOTE — BH INPATIENT PSYCHIATRY PROGRESS NOTE - NSBHFUPINTERVALHXFT_PSY_A_CORE
Patient was seen and evaluated for increasing aggression at home.  Chart, medications and labs reviewed. Case discussed with nursing team, no weekend interval events.  Appetite and sleep maintained.  Patient compliant with medications, no SE reported. Tolerating  increase in Risperdal 3mg qhs.  Patient remains in fair behavioral control, stays mostly in his room  no prns for aggression. He reports feeling "ok” minimally engaging.  Pt reported having 2-3 emesis episodes last night, and not feeling well.  Pt reports feeling better now.  He denies SI/SIB/HI, no plan or intent on unit and engages in safety plan. Patient offers no new complaints. Primarily focused on being dc.  Denies AVH, delusions, or paranoia. No acute medical concerns. Denies pain or discomfort.  Continue to monitor and provide therapeutic support. NP ordered for Flu panel.

## 2024-12-26 NOTE — BH TREATMENT PLAN - NSTXDISORGINTERRN_PSY_ALL_CORE
Assess thought process daily  Monitor medication compliance and response  Maintain reality orientation daily  Maintain safe and therapeutic process
Assess thought process daily  Monitor medication compliance and response  Maintain reality orientation daily  Maintain safe and therapeutic process

## 2024-12-27 PROCEDURE — 99232 SBSQ HOSP IP/OBS MODERATE 35: CPT

## 2024-12-27 RX ORDER — LORAZEPAM 1 MG/1
2 TABLET ORAL EVERY 6 HOURS
Refills: 0 | Status: DISCONTINUED | OUTPATIENT
Start: 2024-12-27 | End: 2025-01-02

## 2024-12-27 RX ORDER — DIVALPROEX SODIUM 500 MG/1
500 TABLET, DELAYED RELEASE ORAL AT BEDTIME
Refills: 0 | Status: DISCONTINUED | OUTPATIENT
Start: 2024-12-27 | End: 2024-12-30

## 2024-12-27 RX ORDER — LORAZEPAM 1 MG/1
2 TABLET ORAL ONCE
Refills: 0 | Status: DISCONTINUED | OUTPATIENT
Start: 2024-12-27 | End: 2025-01-02

## 2024-12-27 RX ADMIN — LORAZEPAM 2 MILLIGRAM(S): 1 TABLET ORAL at 20:25

## 2024-12-27 RX ADMIN — DIVALPROEX SODIUM 500 MILLIGRAM(S): 500 TABLET, DELAYED RELEASE ORAL at 20:26

## 2024-12-27 RX ADMIN — RISPERIDONE 3 MILLIGRAM(S): 0.5 TABLET ORAL at 20:25

## 2024-12-27 NOTE — BH INPATIENT PSYCHIATRY PROGRESS NOTE - NSBHMETABOLIC_PSY_ALL_CORE_FT
BMI: BMI (kg/m2): 25.5 (12-18-24 @ 18:43)  HbA1c:   Glucose:   BP: --Vital Signs Last 24 Hrs  T(C): 37 (12-26-24 @ 20:02), Max: 37 (12-26-24 @ 20:02)  T(F): 98.6 (12-26-24 @ 20:02), Max: 98.6 (12-26-24 @ 20:02)  HR: --  BP: --  BP(mean): --  RR: 18 (12-26-24 @ 21:56) (16 - 18)  SpO2: --    Orthostatic VS  12-26-24 @ 20:02  Lying BP: --/-- HR: --  Sitting BP: 105/87 HR: 110  Standing BP: 123/75 HR: 120  Site: --  Mode: --  Orthostatic VS  12-26-24 @ 08:34  Lying BP: --/-- HR: --  Sitting BP: 104/62 HR: 120  Standing BP: 104/84 HR: 112  Site: upper left arm  Mode: electronic  Orthostatic VS  12-25-24 @ 19:17  Lying BP: --/-- HR: --  Sitting BP: 117/78 HR: 87  Standing BP: 125/75 HR: 94  Site: --  Mode: --  Orthostatic VS  12-25-24 @ 08:31  Lying BP: --/-- HR: --  Sitting BP: 111/66 HR: 67  Standing BP: 119/74 HR: 77  Site: --  Mode: --    Lipid Panel:  BMI: BMI (kg/m2): 25.5 (12-18-24 @ 18:43)  HbA1c:   Glucose:   BP: --Vital Signs Last 24 Hrs  T(C): 36.7 (12-27-24 @ 08:14), Max: 37 (12-26-24 @ 20:02)  T(F): 98.1 (12-27-24 @ 08:14), Max: 98.6 (12-26-24 @ 20:02)  HR: --  BP: --  BP(mean): --  RR: 18 (12-26-24 @ 21:56) (18 - 18)  SpO2: --    Orthostatic VS  12-27-24 @ 08:14  Lying BP: --/-- HR: --  Sitting BP: 109/62 HR: 79  Standing BP: 104/77 HR: 93  Site: --  Mode: --  Orthostatic VS  12-26-24 @ 20:02  Lying BP: --/-- HR: --  Sitting BP: 105/87 HR: 110  Standing BP: 123/75 HR: 120  Site: --  Mode: --  Orthostatic VS  12-26-24 @ 08:34  Lying BP: --/-- HR: --  Sitting BP: 104/62 HR: 120  Standing BP: 104/84 HR: 112  Site: upper left arm  Mode: electronic  Orthostatic VS  12-25-24 @ 19:17  Lying BP: --/-- HR: --  Sitting BP: 117/78 HR: 87  Standing BP: 125/75 HR: 94  Site: --  Mode: --    Lipid Panel:  BMI: BMI (kg/m2): 25.5 (12-18-24 @ 18:43)  HbA1c:   Glucose:   BP: --Vital Signs Last 24 Hrs  T(C): 36.7 (12-27-24 @ 08:14), Max: 37 (12-26-24 @ 20:02)  T(F): 98.1 (12-27-24 @ 08:14), Max: 98.6 (12-26-24 @ 20:02)  HR: --  BP: --  BP(mean): --  RR: 17 (12-27-24 @ 08:14) (17 - 18)  SpO2: --    Orthostatic VS  12-27-24 @ 08:14  Lying BP: --/-- HR: --  Sitting BP: 109/62 HR: 79  Standing BP: 104/77 HR: 93  Site: --  Mode: --  Orthostatic VS  12-26-24 @ 20:02  Lying BP: --/-- HR: --  Sitting BP: 105/87 HR: 110  Standing BP: 123/75 HR: 120  Site: --  Mode: --  Orthostatic VS  12-26-24 @ 08:34  Lying BP: --/-- HR: --  Sitting BP: 104/62 HR: 120  Standing BP: 104/84 HR: 112  Site: upper left arm  Mode: electronic  Orthostatic VS  12-25-24 @ 19:17  Lying BP: --/-- HR: --  Sitting BP: 117/78 HR: 87  Standing BP: 125/75 HR: 94  Site: --  Mode: --    Lipid Panel:

## 2024-12-27 NOTE — BH INPATIENT PSYCHIATRY PROGRESS NOTE - NSBHFUPINTERVALHXFT_PSY_A_CORE
Patient was seen and evaluated for increasing aggression at home.  Chart, medications and labs reviewed. Case discussed with nursing team, no  interval events.  Appetite and sleep maintained.  Patient compliant with medications, no SE reported. Tolerating  increase in Risperdal 3mg qhs, adjunct Depakote 500mg.  Patient remains in fair behavioral control, stays mostly in his room  no prns for aggression. He reports feeling "ok” minimally engaging. He denies SI/SIB/HI, no plan or intent on unit and engages in safety plan. Patient offers no new complaints. Primarily focused on being dc.  Denies AVH, delusions, or paranoia. No acute medical concerns. Denies pain or discomfort.  Continue to monitor and provide therapeutic support.  Flu panel resulted negative.   Addendum:  Spoke with mother Heaven ([phone]).  Who reported that patient initially responded well to Invega AGUIRRE, but after a few months it stopped working.  Heaven reports that she mentioned this to patients outpatient provider and dose was increased to 234mg.  Per mother patient's  mood swings  is the primary concern. Reports he has been labile, angry, hostile without provocation. She reported one minute he is happy next minute he becomes very angry. NP discussed with mother adjunct with Depakote as a mood stabilizer, mother is concerned with having 2 medications.  Medication profile discussed.

## 2024-12-27 NOTE — BH INPATIENT PSYCHIATRY PROGRESS NOTE - NSBHASSESSSUMMFT_PSY_ALL_CORE
Patient is a 23yo man who lives with his mother, is recently unemployed (fired from job at [employer] 3 weeks ago due to verbal altercation with other employee), with PPHx schizophrenia (vs substance induced psychosis?), two prior hospitalizations at [facility], no prior suicide attempts, +hx of violence and aggression at home, recent cannabis use admitted for increasing aggressive behavior at home and refusing to attend outpt appt at [facility] (refused Invega Sustenna).    At present pt without acute mood or psychotic symptoms, though further evaluation and monitoring is necessary. Recent agitation and aggressive behavior (with property destruction) may be related to psychosis vs marijuana use vs behavioral in nature (r/o ODD, cluster b personality pathology). Pt currently calm and cooperative on the unit. Denies SIIP and HIIP.     12/26: Tolerating increase in Risperdal 3mg.  Pt compliant with medications.  Mostly stays in his room, occasionally is seen on unit.  No behavioral issues. He denies SI/HI/AVH.     Plan:  - Admit to ML4, 9.27 legal status  - Routine checks  - Unclear dose and last administration of monthly Invega Sustenna, will seek collateral from [facility] (unable to reach today), for now will continue pt on Titrate Risperdal 3mg qhs, titrate 3mg on 12/24  - Ativan 2mg and Haldol 5mg PO/IM prn agitation   - Admission labs and EKG reviewed and within normal limits (utox THC+)  - Will continue to provide updates to pt's mother  - Pending collateral from [facility]    Patient is a 23yo man who lives with his mother, is recently unemployed (fired from job at [employer] 3 weeks ago due to verbal altercation with other employee), with PPHx schizophrenia (vs substance induced psychosis?), two prior hospitalizations at [facility], no prior suicide attempts, +hx of violence and aggression at home, recent cannabis use admitted for increasing aggressive behavior at home and refusing to attend outpt appt at [facility] (refused Invega Sustenna).    At present pt without acute mood or psychotic symptoms, though further evaluation and monitoring is necessary. Recent agitation and aggressive behavior (with property destruction) may be related to psychosis vs marijuana use vs behavioral in nature (r/o ODD, cluster b personality pathology). Pt currently calm and cooperative on the unit. Denies SIIP and HIIP.     12/26: Tolerating increase in Risperdal 3mg.  Pt compliant with medications.  Mostly stays in his room, occasionally is seen on unit.  No behavioral issues. He denies SI/HI/AVH.     Plan:  - Admit to ML4, 9.27 legal status  - Routine checks  - Unclear dose and last administration of monthly Invega Sustenna, will seek collateral from [facility] (unable to reach today), for now will continue pt on Titrate Risperdal 3mg qhs, titrate 3mg on 12/24  -Ajunct with Depakote 500mg qhs for mood  - Ativan 2mg and Haldol 5mg PO/IM prn agitation   - Admission labs and EKG reviewed and within normal limits (utox THC+)  - Will continue to provide updates to pt's mother  - Pending collateral from [facility]

## 2024-12-27 NOTE — BH INPATIENT PSYCHIATRY PROGRESS NOTE - CURRENT MEDICATION
MEDICATIONS  (STANDING):  risperiDONE   Tablet 3 milliGRAM(s) Oral at bedtime    MEDICATIONS  (PRN):  aluminum hydroxide/magnesium hydroxide/simethicone Suspension 30 milliLiter(s) Oral every 6 hours PRN Dyspepsia  haloperidol     Tablet 5 milliGRAM(s) Oral every 6 hours PRN agitation  haloperidol    Injectable 5 milliGRAM(s) IntraMuscular once PRN severe agitation  LORazepam     Tablet 2 milliGRAM(s) Oral every 6 hours PRN Agitation/Anxiety  LORazepam   Injectable 2 milliGRAM(s) IntraMuscular once PRN Severe aggression   MEDICATIONS  (STANDING):  divalproex  milliGRAM(s) Oral at bedtime  risperiDONE   Tablet 3 milliGRAM(s) Oral at bedtime    MEDICATIONS  (PRN):  aluminum hydroxide/magnesium hydroxide/simethicone Suspension 30 milliLiter(s) Oral every 6 hours PRN Dyspepsia  haloperidol     Tablet 5 milliGRAM(s) Oral every 6 hours PRN agitation  haloperidol    Injectable 5 milliGRAM(s) IntraMuscular once PRN severe agitation  LORazepam     Tablet 2 milliGRAM(s) Oral every 6 hours PRN Agitation/Anxiety  LORazepam   Injectable 2 milliGRAM(s) IntraMuscular once PRN Severe aggression

## 2024-12-27 NOTE — BH INPATIENT PSYCHIATRY PROGRESS NOTE - PRN MEDS
MEDICATIONS  (PRN):  aluminum hydroxide/magnesium hydroxide/simethicone Suspension 30 milliLiter(s) Oral every 6 hours PRN Dyspepsia  haloperidol     Tablet 5 milliGRAM(s) Oral every 6 hours PRN agitation  haloperidol    Injectable 5 milliGRAM(s) IntraMuscular once PRN severe agitation  LORazepam     Tablet 2 milliGRAM(s) Oral every 6 hours PRN Agitation/Anxiety  LORazepam   Injectable 2 milliGRAM(s) IntraMuscular once PRN Severe aggression

## 2024-12-27 NOTE — BH INPATIENT PSYCHIATRY PROGRESS NOTE - NSBHCHARTREVIEWVS_PSY_A_CORE FT
Vital Signs Last 24 Hrs  T(C): 37 (12-26-24 @ 20:02), Max: 37 (12-26-24 @ 20:02)  T(F): 98.6 (12-26-24 @ 20:02), Max: 98.6 (12-26-24 @ 20:02)  HR: --  BP: --  BP(mean): --  RR: 18 (12-26-24 @ 21:56) (16 - 18)  SpO2: --    Orthostatic VS  12-26-24 @ 20:02  Lying BP: --/-- HR: --  Sitting BP: 105/87 HR: 110  Standing BP: 123/75 HR: 120  Site: --  Mode: --  Orthostatic VS  12-26-24 @ 08:34  Lying BP: --/-- HR: --  Sitting BP: 104/62 HR: 120  Standing BP: 104/84 HR: 112  Site: upper left arm  Mode: electronic  Orthostatic VS  12-25-24 @ 19:17  Lying BP: --/-- HR: --  Sitting BP: 117/78 HR: 87  Standing BP: 125/75 HR: 94  Site: --  Mode: --  Orthostatic VS  12-25-24 @ 08:31  Lying BP: --/-- HR: --  Sitting BP: 111/66 HR: 67  Standing BP: 119/74 HR: 77  Site: --  Mode: --   Vital Signs Last 24 Hrs  T(C): 36.7 (12-27-24 @ 08:14), Max: 37 (12-26-24 @ 20:02)  T(F): 98.1 (12-27-24 @ 08:14), Max: 98.6 (12-26-24 @ 20:02)  HR: --  BP: --  BP(mean): --  RR: 18 (12-26-24 @ 21:56) (18 - 18)  SpO2: --    Orthostatic VS  12-27-24 @ 08:14  Lying BP: --/-- HR: --  Sitting BP: 109/62 HR: 79  Standing BP: 104/77 HR: 93  Site: --  Mode: --  Orthostatic VS  12-26-24 @ 20:02  Lying BP: --/-- HR: --  Sitting BP: 105/87 HR: 110  Standing BP: 123/75 HR: 120  Site: --  Mode: --  Orthostatic VS  12-26-24 @ 08:34  Lying BP: --/-- HR: --  Sitting BP: 104/62 HR: 120  Standing BP: 104/84 HR: 112  Site: upper left arm  Mode: electronic  Orthostatic VS  12-25-24 @ 19:17  Lying BP: --/-- HR: --  Sitting BP: 117/78 HR: 87  Standing BP: 125/75 HR: 94  Site: --  Mode: --   Vital Signs Last 24 Hrs  T(C): 36.7 (12-27-24 @ 08:14), Max: 37 (12-26-24 @ 20:02)  T(F): 98.1 (12-27-24 @ 08:14), Max: 98.6 (12-26-24 @ 20:02)  HR: --  BP: --  BP(mean): --  RR: 17 (12-27-24 @ 08:14) (17 - 18)  SpO2: --    Orthostatic VS  12-27-24 @ 08:14  Lying BP: --/-- HR: --  Sitting BP: 109/62 HR: 79  Standing BP: 104/77 HR: 93  Site: --  Mode: --  Orthostatic VS  12-26-24 @ 20:02  Lying BP: --/-- HR: --  Sitting BP: 105/87 HR: 110  Standing BP: 123/75 HR: 120  Site: --  Mode: --  Orthostatic VS  12-26-24 @ 08:34  Lying BP: --/-- HR: --  Sitting BP: 104/62 HR: 120  Standing BP: 104/84 HR: 112  Site: upper left arm  Mode: electronic  Orthostatic VS  12-25-24 @ 19:17  Lying BP: --/-- HR: --  Sitting BP: 117/78 HR: 87  Standing BP: 125/75 HR: 94  Site: --  Mode: --

## 2024-12-28 RX ADMIN — DIVALPROEX SODIUM 500 MILLIGRAM(S): 500 TABLET, DELAYED RELEASE ORAL at 20:35

## 2024-12-28 RX ADMIN — LORAZEPAM 2 MILLIGRAM(S): 1 TABLET ORAL at 22:14

## 2024-12-28 RX ADMIN — RISPERIDONE 3 MILLIGRAM(S): 0.5 TABLET ORAL at 20:35

## 2024-12-29 RX ORDER — GINKGO BILOBA 40 MG
5 CAPSULE ORAL ONCE
Refills: 0 | Status: COMPLETED | OUTPATIENT
Start: 2024-12-29 | End: 2024-12-29

## 2024-12-29 RX ADMIN — DIVALPROEX SODIUM 500 MILLIGRAM(S): 500 TABLET, DELAYED RELEASE ORAL at 20:47

## 2024-12-29 RX ADMIN — Medication 5 MILLIGRAM(S): at 21:25

## 2024-12-29 RX ADMIN — RISPERIDONE 3 MILLIGRAM(S): 0.5 TABLET ORAL at 20:47

## 2024-12-30 PROCEDURE — 99232 SBSQ HOSP IP/OBS MODERATE 35: CPT

## 2024-12-30 RX ORDER — DIVALPROEX SODIUM 500 MG/1
750 TABLET, DELAYED RELEASE ORAL AT BEDTIME
Refills: 0 | Status: DISCONTINUED | OUTPATIENT
Start: 2024-12-30 | End: 2024-12-31

## 2024-12-30 RX ORDER — GINKGO BILOBA 40 MG
3 CAPSULE ORAL AT BEDTIME
Refills: 0 | Status: DISCONTINUED | OUTPATIENT
Start: 2024-12-30 | End: 2025-01-07

## 2024-12-30 RX ADMIN — RISPERIDONE 3 MILLIGRAM(S): 0.5 TABLET ORAL at 20:40

## 2024-12-30 RX ADMIN — DIVALPROEX SODIUM 750 MILLIGRAM(S): 500 TABLET, DELAYED RELEASE ORAL at 20:40

## 2024-12-30 RX ADMIN — Medication 3 MILLIGRAM(S): at 20:40

## 2024-12-30 NOTE — BH INPATIENT PSYCHIATRY PROGRESS NOTE - CURRENT MEDICATION
MEDICATIONS  (STANDING):  divalproex  milliGRAM(s) Oral at bedtime  risperiDONE   Tablet 3 milliGRAM(s) Oral at bedtime    MEDICATIONS  (PRN):  aluminum hydroxide/magnesium hydroxide/simethicone Suspension 30 milliLiter(s) Oral every 6 hours PRN Dyspepsia  haloperidol     Tablet 5 milliGRAM(s) Oral every 6 hours PRN agitation  haloperidol    Injectable 5 milliGRAM(s) IntraMuscular once PRN severe agitation  LORazepam     Tablet 2 milliGRAM(s) Oral every 6 hours PRN Agitation/Anxiety  LORazepam   Injectable 2 milliGRAM(s) IntraMuscular once PRN Severe aggression

## 2024-12-30 NOTE — BH INPATIENT PSYCHIATRY PROGRESS NOTE - NSBHMETABOLIC_PSY_ALL_CORE_FT
BMI: BMI (kg/m2): 25.5 (12-18-24 @ 18:43)  HbA1c:   Glucose:   BP: --Vital Signs Last 24 Hrs  T(C): 36.6 (12-29-24 @ 08:34), Max: 36.6 (12-29-24 @ 08:34)  T(F): 97.8 (12-29-24 @ 08:34), Max: 97.8 (12-29-24 @ 08:34)  HR: --  BP: --  BP(mean): --  RR: 16 (12-29-24 @ 08:34) (16 - 16)  SpO2: --    Orthostatic VS  12-29-24 @ 08:34  Lying BP: --/-- HR: --  Sitting BP: 116/67 HR: 73  Standing BP: 120/66 HR: 93  Site: upper left arm  Mode: electronic  Orthostatic VS  12-28-24 @ 19:38  Lying BP: --/-- HR: --  Sitting BP: 116/80 HR: 78  Standing BP: 123/75 HR: 85  Site: --  Mode: --  Orthostatic VS  12-28-24 @ 08:07  Lying BP: --/-- HR: --  Sitting BP: 137/81 HR: 104  Standing BP: 129/77 HR: 102  Site: upper left arm  Mode: electronic    Lipid Panel:  BMI: BMI (kg/m2): 25.5 (12-18-24 @ 18:43)  HbA1c:   Glucose:   BP: --Vital Signs Last 24 Hrs  T(C): 36.5 (12-30-24 @ 08:00), Max: 36.5 (12-30-24 @ 08:00)  T(F): 97.7 (12-30-24 @ 08:00), Max: 97.7 (12-30-24 @ 08:00)  HR: --  BP: --  BP(mean): --  RR: --  SpO2: --    Orthostatic VS  12-30-24 @ 08:00  Lying BP: --/-- HR: --  Sitting BP: 116/73 HR: 66  Standing BP: 108/76 HR: 76  Site: --  Mode: --  Orthostatic VS  12-29-24 @ 08:34  Lying BP: --/-- HR: --  Sitting BP: 116/67 HR: 73  Standing BP: 120/66 HR: 93  Site: upper left arm  Mode: electronic  Orthostatic VS  12-28-24 @ 19:38  Lying BP: --/-- HR: --  Sitting BP: 116/80 HR: 78  Standing BP: 123/75 HR: 85  Site: --  Mode: --    Lipid Panel:

## 2024-12-30 NOTE — BH INPATIENT PSYCHIATRY PROGRESS NOTE - NSBHCHARTREVIEWVS_PSY_A_CORE FT
Vital Signs Last 24 Hrs  T(C): 36.6 (12-29-24 @ 08:34), Max: 36.6 (12-29-24 @ 08:34)  T(F): 97.8 (12-29-24 @ 08:34), Max: 97.8 (12-29-24 @ 08:34)  HR: --  BP: --  BP(mean): --  RR: 16 (12-29-24 @ 08:34) (16 - 16)  SpO2: --    Orthostatic VS  12-29-24 @ 08:34  Lying BP: --/-- HR: --  Sitting BP: 116/67 HR: 73  Standing BP: 120/66 HR: 93  Site: upper left arm  Mode: electronic  Orthostatic VS  12-28-24 @ 19:38  Lying BP: --/-- HR: --  Sitting BP: 116/80 HR: 78  Standing BP: 123/75 HR: 85  Site: --  Mode: --  Orthostatic VS  12-28-24 @ 08:07  Lying BP: --/-- HR: --  Sitting BP: 137/81 HR: 104  Standing BP: 129/77 HR: 102  Site: upper left arm  Mode: electronic   Vital Signs Last 24 Hrs  T(C): 36.5 (12-30-24 @ 08:00), Max: 36.5 (12-30-24 @ 08:00)  T(F): 97.7 (12-30-24 @ 08:00), Max: 97.7 (12-30-24 @ 08:00)  HR: --  BP: --  BP(mean): --  RR: --  SpO2: --    Orthostatic VS  12-30-24 @ 08:00  Lying BP: --/-- HR: --  Sitting BP: 116/73 HR: 66  Standing BP: 108/76 HR: 76  Site: --  Mode: --  Orthostatic VS  12-29-24 @ 08:34  Lying BP: --/-- HR: --  Sitting BP: 116/67 HR: 73  Standing BP: 120/66 HR: 93  Site: upper left arm  Mode: electronic  Orthostatic VS  12-28-24 @ 19:38  Lying BP: --/-- HR: --  Sitting BP: 116/80 HR: 78  Standing BP: 123/75 HR: 85  Site: --  Mode: --

## 2024-12-30 NOTE — BH INPATIENT PSYCHIATRY PROGRESS NOTE - NSBHFUPINTERVALHXFT_PSY_A_CORE
Patient was seen and evaluated for increasing aggression at home.  Chart, medications and labs reviewed. Case discussed with nursing team, no  interval events.  Appetite and sleep maintained (slept 6hours).  Patient compliant with medications, no SE reported. Tolerating  increase in Risperdal 3mg qhs, adjunct Depakote increase 750mg.  Patient remains in fair behavioral control, stays mostly in his room  no prns for aggression. He reports feeling "ok” minimally engaging. He denies SI/SIB/HI, no plan or intent on unit and engages in safety plan. Patient offers no new complaints. Primarily focused on being dc.  Denies AVH, delusions, or paranoia. No acute medical concerns. Denies pain or discomfort. VSS: 97.7, 116/73, 66, 16.  Continue to monitor and provide therapeutic support.

## 2024-12-30 NOTE — BH INPATIENT PSYCHIATRY PROGRESS NOTE - NSBHASSESSSUMMFT_PSY_ALL_CORE
Patient is a 23yo man who lives with his mother, is recently unemployed (fired from job at [employer] 3 weeks ago due to verbal altercation with other employee), with PPHx schizophrenia (vs substance induced psychosis?), two prior hospitalizations at [facility], no prior suicide attempts, +hx of violence and aggression at home, recent cannabis use admitted for increasing aggressive behavior at home and refusing to attend outpt appt at [facility] (refused Invega Sustenna).    At present pt without acute mood or psychotic symptoms, though further evaluation and monitoring is necessary. Recent agitation and aggressive behavior (with property destruction) may be related to psychosis vs marijuana use vs behavioral in nature (r/o ODD, cluster b personality pathology). Pt currently calm and cooperative on the unit. Denies SIIP and HIIP.     12/26: Tolerating increase in Risperdal 3mg.  Pt compliant with medications.  Mostly stays in his room, occasionally is seen on unit.  No behavioral issues. He denies SI/HI/AVH.     Plan:  - Admit to ML4, 9.27 legal status  - Routine checks  - Unclear dose and last administration of monthly Invega Sustenna, will seek collateral from [facility] (unable to reach today), for now will continue pt on Titrate Risperdal 3mg qhs, titrate 3mg on 12/24  -Adjunct with Depakote 500mg qhs for mood , titrate 750mg on 12/30  - Ativan 2mg and Haldol 5mg PO/IM prn agitation   - Admission labs and EKG reviewed and within normal limits (utox THC+)  - Will continue to provide updates to pt's mother  - Pending collateral from [facility]

## 2024-12-31 PROCEDURE — 99232 SBSQ HOSP IP/OBS MODERATE 35: CPT

## 2024-12-31 RX ORDER — DIVALPROEX SODIUM 500 MG/1
1000 TABLET, DELAYED RELEASE ORAL AT BEDTIME
Refills: 0 | Status: DISCONTINUED | OUTPATIENT
Start: 2024-12-31 | End: 2025-01-07

## 2024-12-31 RX ADMIN — DIVALPROEX SODIUM 1000 MILLIGRAM(S): 500 TABLET, DELAYED RELEASE ORAL at 20:43

## 2024-12-31 RX ADMIN — RISPERIDONE 3 MILLIGRAM(S): 0.5 TABLET ORAL at 20:44

## 2024-12-31 RX ADMIN — Medication 3 MILLIGRAM(S): at 20:43

## 2024-12-31 NOTE — BH INPATIENT PSYCHIATRY PROGRESS NOTE - NSBHASSESSSUMMFT_PSY_ALL_CORE
Patient is a 25yo man who lives with his mother, is recently unemployed (fired from job at [employer] 3 weeks ago due to verbal altercation with other employee), with PPHx schizophrenia (vs substance induced psychosis?), two prior hospitalizations at [facility], no prior suicide attempts, +hx of violence and aggression at home, recent cannabis use admitted for increasing aggressive behavior at home and refusing to attend outpt appt at [facility] (refused Invega Sustenna).    At present pt without acute mood or psychotic symptoms, though further evaluation and monitoring is necessary. Recent agitation and aggressive behavior (with property destruction) may be related to psychosis vs marijuana use vs behavioral in nature (r/o ODD, cluster b personality pathology). Pt currently calm and cooperative on the unit. Denies SIIP and HIIP.     12/26: Tolerating increase in Risperdal 3mg.  Pt compliant with medications.  Mostly stays in his room, occasionally is seen on unit.  No behavioral issues. He denies SI/HI/AVH.     Plan:  - Admit to ML4, 9.27 legal status  - Routine checks  - Unclear dose and last administration of monthly Invega Sustenna, will seek collateral from [facility] (unable to reach today), for now will continue pt on Titrate Risperdal 3mg qhs, titrate 3mg on 12/24  -Adjunct with Depakote 500mg qhs for mood , titrate 750mg on 12/30, titrate 1000mg on 12/31  - Ativan 2mg and Haldol 5mg PO/IM prn agitation   - Admission labs and EKG reviewed and within normal limits (utox THC+)  - Will continue to provide updates to pt's mother  - Pending collateral from [facility]

## 2024-12-31 NOTE — BH INPATIENT PSYCHIATRY PROGRESS NOTE - NSBHFUPINTERVALHXFT_PSY_A_CORE
Patient was seen and evaluated for increasing aggression at home.  Chart, medications and labs reviewed. Case discussed with nursing team, no  interval events.  Appetite and sleep maintained.  Patient compliant with medications, no SE reported. Tolerating  increase in Risperdal 3mg qhs, titrate Depakote increase 1000mg.  Patient remains in fair behavioral control, stays mostly in his room, but socializes with selected peers, no prns for aggression. He reports feeling "ok” minimally engaging, continues to ask for dc. He denies SI/SIB/HI, no plan or intent on unit and engages in safety plan. Patient offers no new complaints. Primarily focused on being dc.  Denies AVH, delusions, or paranoia. No acute medical concerns. Denies pain or discomfort. VSS.  Continue to monitor and provide therapeutic support.     Patient was seen and evaluated for increasing aggression at home.  Chart, medications and labs reviewed. Case discussed with nursing team, no  interval events.  Appetite and sleep maintained.  Patient compliant with medications, no SE reported. Tolerating  increase in Risperdal 3mg qhs, titrate Depakote increase 1000mg.  Patient remains in fair behavioral control, stays mostly in his room, but socializes with selected peers, no prns for aggression. He reports feeling "ok” minimally engaging, continues to ask for dc. He denies SI/SIB/HI, no plan or intent on unit and engages in safety plan. Patient offers no new complaints. Primarily focused on being dc.  Denies AVH, delusions, or paranoia. No acute medical concerns. Denies pain or discomfort. VSS: 98.3, 117/71, 71.  Continue to monitor and provide therapeutic support.     Patient was seen and evaluated for increasing aggression at home.  Chart, medications and labs reviewed. Case discussed with nursing team, no  interval events.  Appetite and sleep maintained.  Patient compliant with medications, no SE reported. Tolerating  increase in Risperdal 3mg qhs, titrate Depakote increase 1000mg.  Patient remains in fair behavioral control, stays mostly in his room, but socializes with selected peers, no prns for aggression. He reports feeling "ok” minimally engaging, continues to ask for dc. Discussed AGUIRRE option with patient, discussed his mothers concerns that Invega does not work for him, pt reports "I feel that it does work for me" discussed other AGUIRRE options ([employer]), pt states he is open to restarting Invega AGUIRRE.   He denies SI/SIB/HI, no plan or intent on unit and engages in safety plan. Patient offers no new complaints. Primarily focused on being dc.  Denies AVH, delusions, or paranoia. No acute medical concerns. Denies pain or discomfort. VSS: 98.3, 117/71, 71.  Continue to monitor and provide therapeutic support.

## 2024-12-31 NOTE — BH INPATIENT PSYCHIATRY PROGRESS NOTE - NSBHMETABOLIC_PSY_ALL_CORE_FT
BMI: BMI (kg/m2): 25.5 (12-18-24 @ 18:43)  HbA1c:   Glucose:   BP: --Vital Signs Last 24 Hrs  T(C): 36.2 (12-30-24 @ 20:41), Max: 36.2 (12-30-24 @ 20:41)  T(F): 97.2 (12-30-24 @ 20:41), Max: 97.2 (12-30-24 @ 20:41)  HR: --  BP: --  BP(mean): --  RR: --  SpO2: --    Orthostatic VS  12-30-24 @ 20:41  Lying BP: --/-- HR: --  Sitting BP: 122/82 HR: 93  Standing BP: 109/66 HR: 85  Site: --  Mode: --  Orthostatic VS  12-30-24 @ 08:00  Lying BP: --/-- HR: --  Sitting BP: 116/73 HR: 66  Standing BP: 108/76 HR: 76  Site: --  Mode: --    Lipid Panel:  BMI: BMI (kg/m2): 25.5 (12-18-24 @ 18:43)  HbA1c:   Glucose:   BP: --Vital Signs Last 24 Hrs  T(C): 36.8 (12-31-24 @ 08:41), Max: 36.8 (12-31-24 @ 08:41)  T(F): 98.3 (12-31-24 @ 08:41), Max: 98.3 (12-31-24 @ 08:41)  HR: --  BP: --  BP(mean): --  RR: 16 (12-31-24 @ 08:41) (16 - 16)  SpO2: --    Orthostatic VS  12-31-24 @ 08:41  Lying BP: --/-- HR: --  Sitting BP: 117/71 HR: 71  Standing BP: 103/70 HR: 84  Site: --  Mode: --  Orthostatic VS  12-30-24 @ 20:41  Lying BP: --/-- HR: --  Sitting BP: 122/82 HR: 93  Standing BP: 109/66 HR: 85  Site: --  Mode: --  Orthostatic VS  12-30-24 @ 08:00  Lying BP: --/-- HR: --  Sitting BP: 116/73 HR: 66  Standing BP: 108/76 HR: 76  Site: --  Mode: --    Lipid Panel:

## 2024-12-31 NOTE — BH INPATIENT PSYCHIATRY PROGRESS NOTE - CURRENT MEDICATION
Patient/Caregiver provided printed discharge information. MEDICATIONS  (STANDING):  divalproex  milliGRAM(s) Oral at bedtime  risperiDONE   Tablet 3 milliGRAM(s) Oral at bedtime    MEDICATIONS  (PRN):  aluminum hydroxide/magnesium hydroxide/simethicone Suspension 30 milliLiter(s) Oral every 6 hours PRN Dyspepsia  haloperidol     Tablet 5 milliGRAM(s) Oral every 6 hours PRN agitation  haloperidol    Injectable 5 milliGRAM(s) IntraMuscular once PRN severe agitation  LORazepam     Tablet 2 milliGRAM(s) Oral every 6 hours PRN Agitation/Anxiety  LORazepam   Injectable 2 milliGRAM(s) IntraMuscular once PRN Severe aggression  melatonin. 3 milliGRAM(s) Oral at bedtime PRN Insomnia   MEDICATIONS  (STANDING):  divalproex ER 1000 milliGRAM(s) Oral at bedtime  risperiDONE   Tablet 3 milliGRAM(s) Oral at bedtime    MEDICATIONS  (PRN):  aluminum hydroxide/magnesium hydroxide/simethicone Suspension 30 milliLiter(s) Oral every 6 hours PRN Dyspepsia  haloperidol     Tablet 5 milliGRAM(s) Oral every 6 hours PRN agitation  haloperidol    Injectable 5 milliGRAM(s) IntraMuscular once PRN severe agitation  LORazepam     Tablet 2 milliGRAM(s) Oral every 6 hours PRN Agitation/Anxiety  LORazepam   Injectable 2 milliGRAM(s) IntraMuscular once PRN Severe aggression  melatonin. 3 milliGRAM(s) Oral at bedtime PRN Insomnia

## 2024-12-31 NOTE — BH INPATIENT PSYCHIATRY PROGRESS NOTE - PRN MEDS
MEDICATIONS  (PRN):  aluminum hydroxide/magnesium hydroxide/simethicone Suspension 30 milliLiter(s) Oral every 6 hours PRN Dyspepsia  haloperidol     Tablet 5 milliGRAM(s) Oral every 6 hours PRN agitation  haloperidol    Injectable 5 milliGRAM(s) IntraMuscular once PRN severe agitation  LORazepam     Tablet 2 milliGRAM(s) Oral every 6 hours PRN Agitation/Anxiety  LORazepam   Injectable 2 milliGRAM(s) IntraMuscular once PRN Severe aggression  melatonin. 3 milliGRAM(s) Oral at bedtime PRN Insomnia

## 2024-12-31 NOTE — BH INPATIENT PSYCHIATRY PROGRESS NOTE - NSBHCHARTREVIEWVS_PSY_A_CORE FT
Vital Signs Last 24 Hrs  T(C): 36.2 (12-30-24 @ 20:41), Max: 36.2 (12-30-24 @ 20:41)  T(F): 97.2 (12-30-24 @ 20:41), Max: 97.2 (12-30-24 @ 20:41)  HR: --  BP: --  BP(mean): --  RR: --  SpO2: --    Orthostatic VS  12-30-24 @ 20:41  Lying BP: --/-- HR: --  Sitting BP: 122/82 HR: 93  Standing BP: 109/66 HR: 85  Site: --  Mode: --  Orthostatic VS  12-30-24 @ 08:00  Lying BP: --/-- HR: --  Sitting BP: 116/73 HR: 66  Standing BP: 108/76 HR: 76  Site: --  Mode: --   Vital Signs Last 24 Hrs  T(C): 36.8 (12-31-24 @ 08:41), Max: 36.8 (12-31-24 @ 08:41)  T(F): 98.3 (12-31-24 @ 08:41), Max: 98.3 (12-31-24 @ 08:41)  HR: --  BP: --  BP(mean): --  RR: 16 (12-31-24 @ 08:41) (16 - 16)  SpO2: --    Orthostatic VS  12-31-24 @ 08:41  Lying BP: --/-- HR: --  Sitting BP: 117/71 HR: 71  Standing BP: 103/70 HR: 84  Site: --  Mode: --  Orthostatic VS  12-30-24 @ 20:41  Lying BP: --/-- HR: --  Sitting BP: 122/82 HR: 93  Standing BP: 109/66 HR: 85  Site: --  Mode: --  Orthostatic VS  12-30-24 @ 08:00  Lying BP: --/-- HR: --  Sitting BP: 116/73 HR: 66  Standing BP: 108/76 HR: 76  Site: --  Mode: --

## 2025-01-01 RX ADMIN — RISPERIDONE 3 MILLIGRAM(S): 0.5 TABLET ORAL at 21:22

## 2025-01-01 RX ADMIN — Medication 3 MILLIGRAM(S): at 22:19

## 2025-01-01 RX ADMIN — DIVALPROEX SODIUM 1000 MILLIGRAM(S): 500 TABLET, DELAYED RELEASE ORAL at 21:22

## 2025-01-02 PROCEDURE — 99232 SBSQ HOSP IP/OBS MODERATE 35: CPT

## 2025-01-02 RX ORDER — LORAZEPAM 1 MG/1
2 TABLET ORAL ONCE
Refills: 0 | Status: DISCONTINUED | OUTPATIENT
Start: 2025-01-02 | End: 2025-01-07

## 2025-01-02 RX ORDER — LORAZEPAM 1 MG/1
2 TABLET ORAL EVERY 6 HOURS
Refills: 0 | Status: DISCONTINUED | OUTPATIENT
Start: 2025-01-02 | End: 2025-01-07

## 2025-01-02 RX ORDER — RISPERIDONE 0.5 MG/1
4 TABLET ORAL AT BEDTIME
Refills: 0 | Status: DISCONTINUED | OUTPATIENT
Start: 2025-01-02 | End: 2025-01-07

## 2025-01-02 RX ADMIN — RISPERIDONE 4 MILLIGRAM(S): 0.5 TABLET ORAL at 20:08

## 2025-01-02 RX ADMIN — DIVALPROEX SODIUM 1000 MILLIGRAM(S): 500 TABLET, DELAYED RELEASE ORAL at 20:08

## 2025-01-02 RX ADMIN — Medication 3 MILLIGRAM(S): at 20:08

## 2025-01-02 NOTE — BH INPATIENT PSYCHIATRY PROGRESS NOTE - NSBHFUPINTERVALHXFT_PSY_A_CORE
Patient was seen and evaluated for aggression.  Chart, medications and labs reviewed (pt is scheduled for VPA level on 1/6). Case discussed with nursing team, no  interval events.  Appetite and sleep maintained.  Patient compliant with medications, no SE reported.  Patient remains in fair behavioral control, socializes with selected peers, no prns for aggression. He reports feeling "ok”   states he is ready to go home, continues to ask for dc. Further discussed AGUIRRE  with patient, previous conversation regarding AGUIRRE pt was open to restarting Invega, however, now pt is recanting stating he prefers to stay on tablets.   He denies SI/SIB/HI, no plan or intent on unit and engages in safety plan. Patient offers no new complaints. Primarily focused on being dc.  Denies AVH, delusions, or paranoia. No acute medical concerns. Denies pain or discomfort. VSS: .  Continue to monitor and provide therapeutic support.     Patient was seen and evaluated for aggression.  Chart, medications and labs reviewed (pt is scheduled for VPA level on 1/6). Case discussed with nursing team, no  interval events.  Appetite and sleep maintained.  Patient compliant with medications, no SE reported.  Patient remains in fair behavioral control, socializes with selected peers, no prns for aggression. He reports feeling "ok”   states he is ready to go home, continues to ask for dc. Further discussed AGUIRRE  with patient, previous conversation regarding AGUIRRE pt was open to restarting Invega, however, now pt is recanting stating he prefers to stay on tablets.   He denies SI/SIB/HI, no plan or intent on unit and engages in safety plan. Patient offers no new complaints. Primarily focused on being dc.  Denies AVH, delusions, or paranoia. No acute medical concerns. Denies pain or discomfort. VSS:98.1, 105/60, 61, 16 .  Discussed with SW, for DC next week Tuesday 1/7. Continue to monitor and provide therapeutic support.

## 2025-01-02 NOTE — BH INPATIENT PSYCHIATRY PROGRESS NOTE - CURRENT MEDICATION
MEDICATIONS  (STANDING):  divalproex ER 1000 milliGRAM(s) Oral at bedtime  risperiDONE   Tablet 3 milliGRAM(s) Oral at bedtime    MEDICATIONS  (PRN):  aluminum hydroxide/magnesium hydroxide/simethicone Suspension 30 milliLiter(s) Oral every 6 hours PRN Dyspepsia  haloperidol     Tablet 5 milliGRAM(s) Oral every 6 hours PRN agitation  haloperidol    Injectable 5 milliGRAM(s) IntraMuscular once PRN severe agitation  LORazepam     Tablet 2 milliGRAM(s) Oral every 6 hours PRN aggression/Anxiety  LORazepam   Injectable 2 milliGRAM(s) IntraMuscular once PRN Severe Aggression  melatonin. 3 milliGRAM(s) Oral at bedtime PRN Insomnia

## 2025-01-02 NOTE — BH SAFETY PLAN - ENVIRONMENT SAFETY 3:
Contact support system H Plasty Text: Given the location of the defect, shape of the defect and the proximity to free margins a H-plasty was deemed most appropriate for repair.  Using a sterile surgical marker, the appropriate advancement arms of the H-plasty were drawn incorporating the defect and placing the expected incisions within the relaxed skin tension lines where possible. The area thus outlined was incised deep to adipose tissue with a #15 scalpel blade. The skin margins were undermined to an appropriate distance in all directions utilizing iris scissors.  The opposing advancement arms were then advanced into place in opposite direction and anchored with interrupted buried subcutaneous sutures.

## 2025-01-02 NOTE — BH INPATIENT PSYCHIATRY PROGRESS NOTE - NSBHMETABOLIC_PSY_ALL_CORE_FT
BMI: BMI (kg/m2): 25.5 (12-18-24 @ 18:43)  HbA1c:   Glucose:   BP: --Vital Signs Last 24 Hrs  T(C): 36.7 (01-01-25 @ 19:45), Max: 36.7 (01-01-25 @ 19:45)  T(F): 98 (01-01-25 @ 19:45), Max: 98 (01-01-25 @ 19:45)  HR: --  BP: --  BP(mean): --  RR: 17 (01-01-25 @ 09:00) (17 - 17)  SpO2: --    Orthostatic VS  01-01-25 @ 19:45  Lying BP: --/-- HR: --  Sitting BP: 108/64 HR: 70  Standing BP: 121/71 HR: 73  Site: --  Mode: --  Orthostatic VS  01-01-25 @ 09:00  Lying BP: --/-- HR: --  Sitting BP: 120/72 HR: 94  Standing BP: 117/69 HR: 82  Site: --  Mode: --  Orthostatic VS  12-31-24 @ 19:25  Lying BP: --/-- HR: --  Sitting BP: 118/71 HR: 103  Standing BP: 117/62 HR: 81  Site: --  Mode: --  Orthostatic VS  12-31-24 @ 08:41  Lying BP: --/-- HR: --  Sitting BP: 117/71 HR: 71  Standing BP: 103/70 HR: 84  Site: --  Mode: --    Lipid Panel:  BMI: BMI (kg/m2): 25.5 (12-18-24 @ 18:43)  HbA1c:   Glucose:   BP: --Vital Signs Last 24 Hrs  T(C): 36.7 (01-02-25 @ 08:23), Max: 36.7 (01-01-25 @ 19:45)  T(F): 98.1 (01-02-25 @ 08:23), Max: 98.1 (01-02-25 @ 08:23)  HR: --  BP: --  BP(mean): --  RR: --  SpO2: --    Orthostatic VS  01-02-25 @ 08:23  Lying BP: --/-- HR: --  Sitting BP: 105/60 HR: 61  Standing BP: 110/72 HR: 68  Site: --  Mode: --  Orthostatic VS  01-01-25 @ 19:45  Lying BP: --/-- HR: --  Sitting BP: 108/64 HR: 70  Standing BP: 121/71 HR: 73  Site: --  Mode: --  Orthostatic VS  01-01-25 @ 09:00  Lying BP: --/-- HR: --  Sitting BP: 120/72 HR: 94  Standing BP: 117/69 HR: 82  Site: --  Mode: --  Orthostatic VS  12-31-24 @ 19:25  Lying BP: --/-- HR: --  Sitting BP: 118/71 HR: 103  Standing BP: 117/62 HR: 81  Site: --  Mode: --    Lipid Panel:

## 2025-01-02 NOTE — BH INPATIENT PSYCHIATRY PROGRESS NOTE - NSBHASSESSSUMMFT_PSY_ALL_CORE
Patient is a 25yo man who lives with his mother, is recently unemployed (fired from job at [employer] 3 weeks ago due to verbal altercation with other employee), with PPHx schizophrenia (vs substance induced psychosis?), two prior hospitalizations at [facility], no prior suicide attempts, +hx of violence and aggression at home, recent cannabis use admitted for increasing aggressive behavior at home and refusing to attend outpt appt at [facility] (refused Invega Sustenna).    At present pt without acute mood or psychotic symptoms, though further evaluation and monitoring is necessary. Recent agitation and aggressive behavior (with property destruction) may be related to psychosis vs marijuana use vs behavioral in nature (r/o ODD, cluster b personality pathology). Pt currently calm and cooperative on the unit. Denies SIIP and HIIP.     12/26: Tolerating increase in Risperdal 3mg.  Pt compliant with medications.  Mostly stays in his room, occasionally is seen on unit.  No behavioral issues. He denies SI/HI/AVH.   1/2: Pt compliant with medications, no SE.  Pt has declined AGUIRRE.   No behavioral issues. He denies SI/HI/AVH. Primarily focused on dc    Plan:  - Admit to [facility]4, 9.27 legal status  - Routine checks  - Unclear dose and last administration of monthly Invega Sustenna, will seek collateral from [facility] (unable to reach today), for now will continue pt on Titrate Risperdal 3mg qhs on 12/24  -Adjunct with Depakote 500mg qhs for mood , titrate 750mg on 12/30, titrate 1000mg on 12/31  - Ativan 2mg and Haldol 5mg PO/IM prn agitation   - Admission labs and EKG reviewed and within normal limits (utox THC+)  - Will continue to provide updates to pt's mother  - Pending collateral from [facility]

## 2025-01-02 NOTE — BH INPATIENT PSYCHIATRY PROGRESS NOTE - NSBHCHARTREVIEWVS_PSY_A_CORE FT
Vital Signs Last 24 Hrs  T(C): 36.7 (01-01-25 @ 19:45), Max: 36.7 (01-01-25 @ 19:45)  T(F): 98 (01-01-25 @ 19:45), Max: 98 (01-01-25 @ 19:45)  HR: --  BP: --  BP(mean): --  RR: 17 (01-01-25 @ 09:00) (17 - 17)  SpO2: --    Orthostatic VS  01-01-25 @ 19:45  Lying BP: --/-- HR: --  Sitting BP: 108/64 HR: 70  Standing BP: 121/71 HR: 73  Site: --  Mode: --  Orthostatic VS  01-01-25 @ 09:00  Lying BP: --/-- HR: --  Sitting BP: 120/72 HR: 94  Standing BP: 117/69 HR: 82  Site: --  Mode: --  Orthostatic VS  12-31-24 @ 19:25  Lying BP: --/-- HR: --  Sitting BP: 118/71 HR: 103  Standing BP: 117/62 HR: 81  Site: --  Mode: --  Orthostatic VS  12-31-24 @ 08:41  Lying BP: --/-- HR: --  Sitting BP: 117/71 HR: 71  Standing BP: 103/70 HR: 84  Site: --  Mode: --   Vital Signs Last 24 Hrs  T(C): 36.7 (01-02-25 @ 08:23), Max: 36.7 (01-01-25 @ 19:45)  T(F): 98.1 (01-02-25 @ 08:23), Max: 98.1 (01-02-25 @ 08:23)  HR: --  BP: --  BP(mean): --  RR: --  SpO2: --    Orthostatic VS  01-02-25 @ 08:23  Lying BP: --/-- HR: --  Sitting BP: 105/60 HR: 61  Standing BP: 110/72 HR: 68  Site: --  Mode: --  Orthostatic VS  01-01-25 @ 19:45  Lying BP: --/-- HR: --  Sitting BP: 108/64 HR: 70  Standing BP: 121/71 HR: 73  Site: --  Mode: --  Orthostatic VS  01-01-25 @ 09:00  Lying BP: --/-- HR: --  Sitting BP: 120/72 HR: 94  Standing BP: 117/69 HR: 82  Site: --  Mode: --  Orthostatic VS  12-31-24 @ 19:25  Lying BP: --/-- HR: --  Sitting BP: 118/71 HR: 103  Standing BP: 117/62 HR: 81  Site: --  Mode: --

## 2025-01-02 NOTE — BH INPATIENT PSYCHIATRY PROGRESS NOTE - PRN MEDS
MEDICATIONS  (PRN):  aluminum hydroxide/magnesium hydroxide/simethicone Suspension 30 milliLiter(s) Oral every 6 hours PRN Dyspepsia  haloperidol     Tablet 5 milliGRAM(s) Oral every 6 hours PRN agitation  haloperidol    Injectable 5 milliGRAM(s) IntraMuscular once PRN severe agitation  LORazepam     Tablet 2 milliGRAM(s) Oral every 6 hours PRN aggression/Anxiety  LORazepam   Injectable 2 milliGRAM(s) IntraMuscular once PRN Severe Aggression  melatonin. 3 milliGRAM(s) Oral at bedtime PRN Insomnia

## 2025-01-03 PROCEDURE — 99232 SBSQ HOSP IP/OBS MODERATE 35: CPT

## 2025-01-03 RX ADMIN — DIVALPROEX SODIUM 1000 MILLIGRAM(S): 500 TABLET, DELAYED RELEASE ORAL at 20:37

## 2025-01-03 RX ADMIN — RISPERIDONE 4 MILLIGRAM(S): 0.5 TABLET ORAL at 20:37

## 2025-01-03 NOTE — BH INPATIENT PSYCHIATRY PROGRESS NOTE - NSBHMETABOLIC_PSY_ALL_CORE_FT
BMI: BMI (kg/m2): 25.5 (12-18-24 @ 18:43)  HbA1c:   Glucose:   BP: --Vital Signs Last 24 Hrs  T(C): 36.4 (01-02-25 @ 19:23), Max: 36.7 (01-02-25 @ 08:23)  T(F): 97.6 (01-02-25 @ 19:23), Max: 98.1 (01-02-25 @ 08:23)  HR: --  BP: --  BP(mean): --  RR: 18 (01-02-25 @ 21:09) (15 - 18)  SpO2: --    Orthostatic VS  01-02-25 @ 19:23  Lying BP: --/-- HR: --  Sitting BP: 130/69 HR: 118  Standing BP: 110/64 HR: 114  Site: --  Mode: --  Orthostatic VS  01-02-25 @ 08:23  Lying BP: --/-- HR: --  Sitting BP: 105/60 HR: 61  Standing BP: 110/72 HR: 68  Site: --  Mode: --  Orthostatic VS  01-01-25 @ 19:45  Lying BP: --/-- HR: --  Sitting BP: 108/64 HR: 70  Standing BP: 121/71 HR: 73  Site: --  Mode: --  Orthostatic VS  01-01-25 @ 09:00  Lying BP: --/-- HR: --  Sitting BP: 120/72 HR: 94  Standing BP: 117/69 HR: 82  Site: --  Mode: --    Lipid Panel:  BMI: BMI (kg/m2): 25.5 (12-18-24 @ 18:43)  HbA1c:   Glucose:   BP: --Vital Signs Last 24 Hrs  T(C): 36.5 (01-03-25 @ 08:22), Max: 36.5 (01-03-25 @ 08:22)  T(F): 97.7 (01-03-25 @ 08:22), Max: 97.7 (01-03-25 @ 08:22)  HR: --  BP: --  BP(mean): --  RR: 16 (01-03-25 @ 08:22) (16 - 18)  SpO2: --    Orthostatic VS  01-03-25 @ 08:22  Lying BP: --/-- HR: --  Sitting BP: 114/69 HR: 67  Standing BP: 113/69 HR: 78  Site: --  Mode: --  Orthostatic VS  01-02-25 @ 19:23  Lying BP: --/-- HR: --  Sitting BP: 130/69 HR: 118  Standing BP: 110/64 HR: 114  Site: --  Mode: --  Orthostatic VS  01-02-25 @ 08:23  Lying BP: --/-- HR: --  Sitting BP: 105/60 HR: 61  Standing BP: 110/72 HR: 68  Site: --  Mode: --  Orthostatic VS  01-01-25 @ 19:45  Lying BP: --/-- HR: --  Sitting BP: 108/64 HR: 70  Standing BP: 121/71 HR: 73  Site: --  Mode: --    Lipid Panel:

## 2025-01-03 NOTE — BH INPATIENT PSYCHIATRY PROGRESS NOTE - NSBHASSESSSUMMFT_PSY_ALL_CORE
Patient is a 23yo man who lives with his mother, is recently unemployed (fired from job at [employer] 3 weeks ago due to verbal altercation with other employee), with PPHx schizophrenia (vs substance induced psychosis?), two prior hospitalizations at [facility], no prior suicide attempts, +hx of violence and aggression at home, recent cannabis use admitted for increasing aggressive behavior at home and refusing to attend outpt appt at [facility] (refused Invega Sustenna).    At present pt without acute mood or psychotic symptoms, though further evaluation and monitoring is necessary. Recent agitation and aggressive behavior (with property destruction) may be related to psychosis vs marijuana use vs behavioral in nature (r/o ODD, cluster b personality pathology). Pt currently calm and cooperative on the unit. Denies SIIP and HIIP.     12/26: Tolerating increase in Risperdal 3mg.  Pt compliant with medications.  Mostly stays in his room, occasionally is seen on unit.  No behavioral issues. He denies SI/HI/AVH.   1/2: Pt compliant with medications, no SE.  Pt has declined AGUIRRE.   No behavioral issues. He denies SI/HI/AVH. Primarily focused on dc    Plan:  - Admit to [facility], 9.27 legal status  - Routine checks  - Unclear dose and last administration of monthly Invega Sustenna, will seek collateral from [facility] (unable to reach today), for now will continue pt on Titrate Risperdal 3mg qhs on 12/24  -Adjunct with Depakote 500mg qhs for mood , titrate 750mg on 12/30, titrate 1000mg on 12/31  - Ativan 2mg and Haldol 5mg PO/IM prn agitation   - Admission labs and EKG reviewed and within normal limits (utox THC+)  - Will continue to provide updates to pt's mother  - Pending collateral from [facility]

## 2025-01-03 NOTE — BH INPATIENT PSYCHIATRY PROGRESS NOTE - NSBHFUPINTERVALHXFT_PSY_A_CORE
Patient was seen and evaluated for aggression/psychosis.  Chart, medications and labs reviewed (pt is scheduled for VPA level on 1/6). Case discussed with nursing team, no  interval events.  No appetite or sleep disturbances.  Patient compliant with medications, no SE reported.  Patient remains in fair behavioral control, no prns for aggression.  Offers no complaint.  He reports feeling "ok”  remains tanciturn, states he is ready to go home, reports that he will continue taking his medications daily as prescribed.  NP encouraged AGUIRRE however pt declines.  He denies SI/SIB/HI, no plan or intent on unit and engages in safety plan. Primarily concerned about dc, discussed dc for early next week (Tuesday 1/7).  Denies AVH, delusions, or paranoia. No acute medical concerns. Denies pain or discomfort. VSS:....  Discussed with SW, for DC next week Tuesday, per SW pt will return home, and f/u with outpatient at [facility]. Continue to monitor and provide therapeutic support.     Patient was seen and evaluated for aggression/psychosis.  Chart, medications and labs reviewed (pt is scheduled for VPA level on 1/6). Case discussed with nursing team, no  interval events.  No appetite or sleep disturbances.  Patient compliant with medications, no SE reported.  Patient remains in fair behavioral control, no prns for aggression.  Offers no complaint.  He reports feeling "ok”  remains tanciturn, states he is ready to go home, reports that he will continue taking his medications daily as prescribed.  NP encouraged AGUIRRE however pt declines.  He denies SI/SIB/HI, no plan or intent on unit and engages in safety plan. Primarily concerned about dc, discussed dc for early next week (Tuesday 1/7).  Patient is aware he will return to outpatient clinic [facility]. Denies AVH, delusions, or paranoia. No acute medical concerns. Denies pain or discomfort. VSS: 97.7, 114/69, 67.  Discussed with ROQUE, for DC next week Tuesday, per SW pt will return home, and f/u with outpatient at [facility]. Continue to monitor and provide therapeutic support.

## 2025-01-03 NOTE — BH INPATIENT PSYCHIATRY PROGRESS NOTE - CURRENT MEDICATION
MEDICATIONS  (STANDING):  divalproex ER 1000 milliGRAM(s) Oral at bedtime  risperiDONE   Tablet 4 milliGRAM(s) Oral at bedtime    MEDICATIONS  (PRN):  aluminum hydroxide/magnesium hydroxide/simethicone Suspension 30 milliLiter(s) Oral every 6 hours PRN Dyspepsia  haloperidol     Tablet 5 milliGRAM(s) Oral every 6 hours PRN agitation  haloperidol    Injectable 5 milliGRAM(s) IntraMuscular once PRN severe agitation  LORazepam     Tablet 2 milliGRAM(s) Oral every 6 hours PRN aggression/Anxiety  LORazepam   Injectable 2 milliGRAM(s) IntraMuscular once PRN Severe Aggression  melatonin. 3 milliGRAM(s) Oral at bedtime PRN Insomnia

## 2025-01-03 NOTE — BH INPATIENT PSYCHIATRY PROGRESS NOTE - NSBHCHARTREVIEWVS_PSY_A_CORE FT
Vital Signs Last 24 Hrs  T(C): 36.4 (01-02-25 @ 19:23), Max: 36.7 (01-02-25 @ 08:23)  T(F): 97.6 (01-02-25 @ 19:23), Max: 98.1 (01-02-25 @ 08:23)  HR: --  BP: --  BP(mean): --  RR: 18 (01-02-25 @ 21:09) (15 - 18)  SpO2: --    Orthostatic VS  01-02-25 @ 19:23  Lying BP: --/-- HR: --  Sitting BP: 130/69 HR: 118  Standing BP: 110/64 HR: 114  Site: --  Mode: --  Orthostatic VS  01-02-25 @ 08:23  Lying BP: --/-- HR: --  Sitting BP: 105/60 HR: 61  Standing BP: 110/72 HR: 68  Site: --  Mode: --  Orthostatic VS  01-01-25 @ 19:45  Lying BP: --/-- HR: --  Sitting BP: 108/64 HR: 70  Standing BP: 121/71 HR: 73  Site: --  Mode: --  Orthostatic VS  01-01-25 @ 09:00  Lying BP: --/-- HR: --  Sitting BP: 120/72 HR: 94  Standing BP: 117/69 HR: 82  Site: --  Mode: --   Vital Signs Last 24 Hrs  T(C): 36.5 (01-03-25 @ 08:22), Max: 36.5 (01-03-25 @ 08:22)  T(F): 97.7 (01-03-25 @ 08:22), Max: 97.7 (01-03-25 @ 08:22)  HR: --  BP: --  BP(mean): --  RR: 16 (01-03-25 @ 08:22) (16 - 18)  SpO2: --    Orthostatic VS  01-03-25 @ 08:22  Lying BP: --/-- HR: --  Sitting BP: 114/69 HR: 67  Standing BP: 113/69 HR: 78  Site: --  Mode: --  Orthostatic VS  01-02-25 @ 19:23  Lying BP: --/-- HR: --  Sitting BP: 130/69 HR: 118  Standing BP: 110/64 HR: 114  Site: --  Mode: --  Orthostatic VS  01-02-25 @ 08:23  Lying BP: --/-- HR: --  Sitting BP: 105/60 HR: 61  Standing BP: 110/72 HR: 68  Site: --  Mode: --  Orthostatic VS  01-01-25 @ 19:45  Lying BP: --/-- HR: --  Sitting BP: 108/64 HR: 70  Standing BP: 121/71 HR: 73  Site: --  Mode: --

## 2025-01-04 RX ADMIN — DIVALPROEX SODIUM 1000 MILLIGRAM(S): 500 TABLET, DELAYED RELEASE ORAL at 21:02

## 2025-01-04 RX ADMIN — RISPERIDONE 4 MILLIGRAM(S): 0.5 TABLET ORAL at 21:02

## 2025-01-04 NOTE — BH DISCHARGE NOTE NURSING/SOCIAL WORK/PSYCH REHAB - PATIENT PORTAL LINK FT
You can access the FollowMyHealth Patient Portal offered by [facility] by registering at the following website: [email or web address]. By joining [employer]’s FollowMyHealth portal, you will also be able to view your health information using other applications (apps) compatible with our system.

## 2025-01-04 NOTE — BH DISCHARGE NOTE NURSING/SOCIAL WORK/PSYCH REHAB - NSDCPRGOAL_PSY_ALL_CORE
Patient has made improvements on Psychiatric Rehabilitation goal of identifying triggers that exasperate impulsivity/agitation. Progress is evidenced by the patient’s good behavioral control on the unit and his ability to identify specific warning signs that indicate impulsivity/agitation. Patient is internally preoccupied at times, quiet, and has been focused on discharge from admission. Patient has an increase in positive affect upon learning of set discharge date. Additionally, patient has remained isolative to self, social at times with selective peers. Patient denies SI/HI as well as AH/VH. Approx. 65% of groups have been attended at times in which he is able to tolerate the structure and participate when prompted to do so. Patient has completed a safety plan and will receive a copy upon discharge.

## 2025-01-04 NOTE — BH DISCHARGE NOTE NURSING/SOCIAL WORK/PSYCH REHAB - DISCHARGE INSTRUCTIONS AFTERCARE APPOINTMENTS
In order to check the location, date, or time of your aftercare appointment, please refer to your Discharge Instructions Document given to you upon leaving the hospital.  If you have lost the instructions please call [phone]

## 2025-01-05 RX ADMIN — RISPERIDONE 4 MILLIGRAM(S): 0.5 TABLET ORAL at 20:46

## 2025-01-05 RX ADMIN — DIVALPROEX SODIUM 1000 MILLIGRAM(S): 500 TABLET, DELAYED RELEASE ORAL at 20:46

## 2025-01-06 LAB — VALPROATE SERPL-MCNC: 105.2 UG/ML — HIGH (ref 50–100)

## 2025-01-06 PROCEDURE — 99232 SBSQ HOSP IP/OBS MODERATE 35: CPT

## 2025-01-06 RX ORDER — RISPERIDONE 0.5 MG/1
1 TABLET ORAL
Qty: 30 | Refills: 0
Start: 2025-01-06 | End: 2025-02-04

## 2025-01-06 RX ORDER — DIVALPROEX SODIUM 500 MG/1
2 TABLET, DELAYED RELEASE ORAL
Qty: 60 | Refills: 0
Start: 2025-01-06 | End: 2025-02-04

## 2025-01-06 RX ADMIN — RISPERIDONE 4 MILLIGRAM(S): 0.5 TABLET ORAL at 20:10

## 2025-01-06 RX ADMIN — DIVALPROEX SODIUM 1000 MILLIGRAM(S): 500 TABLET, DELAYED RELEASE ORAL at 20:10

## 2025-01-06 RX ADMIN — Medication 3 MILLIGRAM(S): at 20:43

## 2025-01-06 NOTE — BH INPATIENT PSYCHIATRY PROGRESS NOTE - NSTXDCOTHRGOAL_PSY_ALL_CORE
Pt will show a reduction of symptoms and engage meaningfully with writer to identify a safe discharge plan. Pt will comply with recommended tx plan and medications for 5 days. Identify 2 benefits for adherring to tx.

## 2025-01-06 NOTE — BH INPATIENT PSYCHIATRY PROGRESS NOTE - NSBHFUPINTERVALHXFT_PSY_A_CORE
Chart and history reviewed and discussed with treatment team. No events reported overnight. Seen with SW. Pt reports that he feels safe for discharge tomorrow, denying any SI/AVH and states that he feels his mood is stable and at baseline. Discussed importance of medication compliance as pt previously expressed desire not to transition back to AGUIRRE. Discussed following up with outpatient providers to start individual therapy. No behavioral issues noted. Medication compliant, tolerating well, free of EPS. Sleep and appetite maintained. Denies SI/HI/AVH. Continue to monitor for safety.

## 2025-01-06 NOTE — BH INPATIENT PSYCHIATRY PROGRESS NOTE - NSTXIMPULSGOAL_PSY_ALL_CORE
Will be able to recognize 2+ triggers
Will identify 1 reason or reinforcement for impulsive behavior
Will be able to recognize 2+ triggers

## 2025-01-06 NOTE — BH INPATIENT PSYCHIATRY DISCHARGE NOTE - ATTENDING DISCHARGE PHYSICAL EXAMINATION:
I have personally seen and evaluated patient prior to discharge. Chart reviewed and discharge plan discussed with the NP as follows. Pt prior to discharge was calm, cooperative, friendly. Patient interacting better with others. No recent behavioral problems and no episodes of aggressive or dangerous behavior. No problems with sleep, appetite or energy level. Denied any active and current manic, hypomanic, depressive, psychotic or anxiety symptoms. Denied any current SI/HI/AH/VH/PI. No overt delusions.  Patient is more organized and commits to safety and to ongoing outpatient treatment.   Pt asks relevant questions about his discharge plan and about the medication regimen. Pt articulate a plan for living life after discharge. Medication risks/benefits/side effects were discussed with the patient.  Patient expressed understanding and agreed with the treatment plan. Further, instructed the patient to seek help immediately by dialing "911" or by going to the nearest ER if symptoms worsen.   Pt does not pose an acute elevated risk of imminent danger to harm to self or others. Does not require further inpatient psychiatric admission at this time. Psychiatrically cleared for discharge.   Pt urged to avoid using any alcohol or street drugs, particularly marijuana & K2, cocaine and methamphetamine (crystal meth) once discharged.  Safety plan filled out by patient and submitted into chart.

## 2025-01-06 NOTE — BH INPATIENT PSYCHIATRY PROGRESS NOTE - NSTXCOPEDATETRGT_PSY_ALL_CORE
25-Dec-2024
25-Dec-2024
01-Jan-2025
25-Dec-2024
08-Jan-2025
25-Dec-2024
01-Jan-2025
08-Jan-2025
25-Dec-2024
01-Jan-2025
08-Jan-2025
01-Jan-2025

## 2025-01-06 NOTE — BH INPATIENT PSYCHIATRY PROGRESS NOTE - NSTXDCOTHRDATEEST_PSY_ALL_CORE
19-Dec-2024
26-Dec-2024
02-Jan-2025
01-Jan-2025
02-Jan-2025
26-Dec-2024
26-Dec-2024

## 2025-01-06 NOTE — BH INPATIENT PSYCHIATRY DISCHARGE NOTE - NSBHASSESSSUMMFT_PSY_ALL_CORE
On day of discharge, pt encouraged to continue medication regiment. Pt educated on importance of monitoring for worsening symptoms, and to call 911 or go to the nearest emergency department if not feeling safe in the community. Pt provided with numbers to Suicide Prevention and [facility] and educated on their use. Pt voiced understanding to education. Pt was provided with one month supply of medication and a follow up appointment with outpatient psychiatric services. Pt reported feeling safe for discharge and to continue treatment with [facility].     Pt is at a chronic risk to self injury due to, but not limited by: serious mental illness, hx of substance use, past psychiatric inpatient hospitalizations. Protective factors include: supportive family, consistent outpatient treatment, no known hx of SA

## 2025-01-06 NOTE — BH INPATIENT PSYCHIATRY PROGRESS NOTE - NSICDXBHTERTIARYDX_PSY_ALL_CORE
R/O Substance-induced psychotic disorder   F19.959  R/O Oppositional defiant disorder   F91.3  

## 2025-01-06 NOTE — BH INPATIENT PSYCHIATRY PROGRESS NOTE - NSTXIMPULSPROGRES_PSY_ALL_CORE
Improving
Improving
No Change
Improving
No Change
No Change
Improving
No Change
Improving
Improving

## 2025-01-06 NOTE — BH INPATIENT PSYCHIATRY DISCHARGE NOTE - NSBHMETABOLIC_PSY_ALL_CORE_FT
BMI: BMI (kg/m2): 26.4 (01-04-25 @ 15:30)  HbA1c:   Glucose:   BP: --Vital Signs Last 24 Hrs  T(C): 36.9 (01-06-25 @ 08:31), Max: 36.9 (01-06-25 @ 08:31)  T(F): 98.4 (01-06-25 @ 08:31), Max: 98.4 (01-06-25 @ 08:31)  HR: --  BP: --  BP(mean): --  RR: 17 (01-06-25 @ 08:31) (17 - 17)  SpO2: --    Orthostatic VS  01-06-25 @ 08:31  Lying BP: --/-- HR: --  Sitting BP: 122/69 HR: 64  Standing BP: 113/70 HR: 74  Site: --  Mode: --  Orthostatic VS  01-05-25 @ 18:31  Lying BP: --/-- HR: --  Sitting BP: 109/70 HR: 71  Standing BP: 96/71 HR: 104  Site: --  Mode: --  Orthostatic VS  01-05-25 @ 08:33  Lying BP: --/-- HR: --  Sitting BP: 110/74 HR: 70  Standing BP: 115/75 HR: 75  Site: --  Mode: electronic  Orthostatic VS  01-04-25 @ 20:00  Lying BP: --/-- HR: --  Sitting BP: 121/61 HR: 74  Standing BP: 112/58 HR: 87  Site: upper left arm  Mode: electronic    Lipid Panel:

## 2025-01-06 NOTE — BH INPATIENT PSYCHIATRY DISCHARGE NOTE - HPI (INCLUDE ILLNESS QUALITY, SEVERITY, DURATION, TIMING, CONTEXT, MODIFYING FACTORS, ASSOCIATED SIGNS AND SYMPTOMS)
Patient is a 25yo man who lives with his mother, is recently unemployed (fired from job at [employer] 3 weeks ago), with PPHx schizophrenia (vs substance induced psychosis?), two prior hospitalizations at [facility], no prior suicide attempts, +hx of violence and aggression at home, recent cannabis use admitted for increasing aggressive behavior at home and refusing to attend outpt appt at [facility] (refused Invega Sustenna).    Per assessment at [facility] ED:  Pt presents with minimal insight into his recent behavior or remorse for his actions but denies any current SI/HI/AH/VH/paranoia, Collateral from mother states that she was driving the pt to his psychiatrist appt today when the pt became acutely agitated stating he would not attend the appt and was refusing his injection. Mother states the pt began screaming and yelling and cursing in the car. Per mother, the pt's mood has become increasingly more irritable and aggressive lately. Mother feels that the pt's monthly Invega shot is no longer therapeutic and states for the past 2-3 weeks the pt has been up all night and extremely irritable and "cortes." She states the pt did have a job at a [employer] but was fired 3 weeks ago after an altercation with a co-worker. Mother endorses the pt breaking doors and throwing things in the home and does not feel safe with the pt in the home. Mother expresses safety concerns and feels the pt may warrant a psychiatric admission at this time.    On admission assessment to 4:  Pt reports he was in the car with his mother to attend psych appointment at [facility] for monthly Invega Sustenna injection and he had an argument with her and refused to go. Says he does not like getting monthly injection due to pain he experiences, also does not believe he needs the injection. When questioned about agitation and aggression at home pt admits to breaking a door at home and says that his mother triggers him and he gets frustrated and upset. Pt reports recent fair mood, denies SIIP and HIIP. He reports sleeping well and having a good appetite and energy. He denies feeling paranoid, believing he is being surveilled, thoughts manipulation, or AH and VH. States that in the past he may have experienced such symptoms which he relates to prior hospitalization and current treatment with Invega Sustenna. He says that at the time he was smoking marijuana consistently. Says he still smokes now though infrequently. Denies other substance use now, including alcohol. Pt frustrated at current hospitalization, desires discharge so he can look for a new job at [employer]. Says he was fired from job at [employer] due to verbal altercation with another employee. Agreeable to start PO Risperdal pending collateral from outpatient psychiatrist.     Spoke with pt's mother who reports that for the past year pt has been extremely "mood and angry." Says that at home he has broken many doors and throws the refrigerator and microwave. Says that she is at time afraid of him. Says that patient is very negative. Says he sleeps very poorly. She is not able to identify any frankly psychotic symptoms when asked. She reports the above behavior despite previous compliance with Invega Sustenna.

## 2025-01-06 NOTE — BH INPATIENT PSYCHIATRY PROGRESS NOTE - NSBHCONSDANGEROTHERS_PSY_A_CORE
high risk for assault

## 2025-01-06 NOTE — BH INPATIENT PSYCHIATRY PROGRESS NOTE - NSBHASSESSSUMMFT_PSY_ALL_CORE
Patient is a 25yo man who lives with his mother, is recently unemployed (fired from job at [employer] 3 weeks ago due to verbal altercation with other employee), with PPHx schizophrenia (vs substance induced psychosis?), two prior hospitalizations at [facility], no prior suicide attempts, +hx of violence and aggression at home, recent cannabis use admitted for increasing aggressive behavior at home and refusing to attend outpt appt at [facility] (refused Invega Sustenna).    At present pt without acute mood or psychotic symptoms, though further evaluation and monitoring is necessary. Recent agitation and aggressive behavior (with property destruction) may be related to psychosis vs marijuana use vs behavioral in nature (r/o ODD, cluster b personality pathology). Pt currently calm and cooperative on the unit. Denies SIIP and HIIP.     1/6: pt appears at baseline and is safe for discharge tomorrow to home.     Plan:  - Admit to 4, 9.27 legal status  - Routine checks  - Unclear dose and last administration of monthly Invega Sustenna, will seek collateral from [facility] (unable to reach today), for now will continue pt on Titrate Risperdal 3mg qhs on 12/24  -Adjunct with Depakote 500mg qhs for mood , titrate 750mg on 12/30, titrate 1000mg on 12/31  - Ativan 2mg and Haldol 5mg PO/IM prn agitation   - Admission labs and EKG reviewed and within normal limits (utox THC+)  - Will continue to provide updates to pt's mother  - Pending collateral from [facility]

## 2025-01-06 NOTE — BH INPATIENT PSYCHIATRY PROGRESS NOTE - NSTXDISORGDATEEST_PSY_ALL_CORE
18-Dec-2024

## 2025-01-06 NOTE — BH INPATIENT PSYCHIATRY PROGRESS NOTE - NSTXIMPULSDATEEST_PSY_ALL_CORE
19-Dec-2024
01-Jan-2025

## 2025-01-06 NOTE — BH INPATIENT PSYCHIATRY PROGRESS NOTE - NSBHATTESTBILLING_PSY_A_CORE
53212-Zqrlnwwfav OBS or IP - moderate complexity OR 35-49 mins
36424-Bzxypdvoik OBS or IP - moderate complexity OR 35-49 mins
52036-Drxzhxonbm OBS or IP - moderate complexity OR 35-49 mins
28897-Jvnkrredbz OBS or IP - moderate complexity OR 35-49 mins
01542-Oktkzldxuy OBS or IP - moderate complexity OR 35-49 mins
16892-Nbsrjvboim OBS or IP - moderate complexity OR 35-49 mins
15858-Bmecqjiboh OBS or IP - moderate complexity OR 35-49 mins
08167-Edjnxfbbwb OBS or IP - moderate complexity OR 35-49 mins
00136-Hokggcrcxx OBS or IP - moderate complexity OR 35-49 mins
11262-Smyddnwayo OBS or IP - moderate complexity OR 35-49 mins
45382-Pamvyghiai OBS or IP - moderate complexity OR 35-49 mins
21649-Kssarqodla OBS or IP - moderate complexity OR 35-49 mins

## 2025-01-06 NOTE — BH INPATIENT PSYCHIATRY PROGRESS NOTE - NSBHMETABOLIC_PSY_ALL_CORE_FT
BMI: BMI (kg/m2): 26.4 (01-04-25 @ 15:30)  HbA1c:   Glucose:   BP: --Vital Signs Last 24 Hrs  T(C): 36.9 (01-06-25 @ 08:31), Max: 36.9 (01-06-25 @ 08:31)  T(F): 98.4 (01-06-25 @ 08:31), Max: 98.4 (01-06-25 @ 08:31)  HR: --  BP: --  BP(mean): --  RR: 17 (01-06-25 @ 08:31) (17 - 17)  SpO2: --    Orthostatic VS  01-06-25 @ 08:31  Lying BP: --/-- HR: --  Sitting BP: 122/69 HR: 64  Standing BP: 113/70 HR: 74  Site: --  Mode: --  Orthostatic VS  01-05-25 @ 18:31  Lying BP: --/-- HR: --  Sitting BP: 109/70 HR: 71  Standing BP: 96/71 HR: 104  Site: --  Mode: --  Orthostatic VS  01-05-25 @ 08:33  Lying BP: --/-- HR: --  Sitting BP: 110/74 HR: 70  Standing BP: 115/75 HR: 75  Site: --  Mode: electronic  Orthostatic VS  01-04-25 @ 20:00  Lying BP: --/-- HR: --  Sitting BP: 121/61 HR: 74  Standing BP: 112/58 HR: 87  Site: upper left arm  Mode: electronic    Lipid Panel:  BMI: BMI (kg/m2): 26.4 (01-04-25 @ 15:30)  HbA1c:   Glucose:   BP: --Vital Signs Last 24 Hrs  T(C): 36.4 (01-07-25 @ 08:29), Max: 36.4 (01-07-25 @ 08:29)  T(F): 97.6 (01-07-25 @ 08:29), Max: 97.6 (01-07-25 @ 08:29)  HR: --  BP: --  BP(mean): --  RR: 16 (01-07-25 @ 08:29) (16 - 18)  SpO2: --    Orthostatic VS  01-07-25 @ 08:29  Lying BP: --/-- HR: --  Sitting BP: 128/87 HR: 88  Standing BP: 127/89 HR: 101  Site: --  Mode: electronic  Orthostatic VS  01-06-25 @ 19:34  Lying BP: --/-- HR: --  Sitting BP: 131/73 HR: 66  Standing BP: 90/77 HR: 79  Site: --  Mode: --  Orthostatic VS  01-06-25 @ 08:31  Lying BP: --/-- HR: --  Sitting BP: 122/69 HR: 64  Standing BP: 113/70 HR: 74  Site: --  Mode: --  Orthostatic VS  01-05-25 @ 18:31  Lying BP: --/-- HR: --  Sitting BP: 109/70 HR: 71  Standing BP: 96/71 HR: 104  Site: --  Mode: --    Lipid Panel:

## 2025-01-06 NOTE — BH INPATIENT PSYCHIATRY PROGRESS NOTE - NSTXDISORGDATETRGT_PSY_ALL_CORE
25-Dec-2024
01-Jan-2025
25-Dec-2024
01-Jan-2025
25-Dec-2024
01-Jan-2025

## 2025-01-06 NOTE — BH INPATIENT PSYCHIATRY DISCHARGE NOTE - NSDCMRMEDTOKEN_GEN_ALL_CORE_FT
divalproex sodium 500 mg oral tablet, extended release: 2 tab(s) orally once a day (at bedtime)  risperiDONE 4 mg oral tablet: 1 tab(s) orally once a day (at bedtime)

## 2025-01-06 NOTE — BH INPATIENT PSYCHIATRY PROGRESS NOTE - MSE OPTIONS
Structured MSE
Structured MSE
Unstructured MSE
Unstructured MSE
Structured MSE
Structured MSE
Unstructured MSE
Unstructured MSE
Structured MSE
Unstructured MSE
Structured MSE
Structured MSE

## 2025-01-06 NOTE — BH INPATIENT PSYCHIATRY DISCHARGE NOTE - HOSPITAL COURSE
On initial assessment, pt presented with tenuous impulse control, irritable mood regarding continuation of invega sustenna AGUIRRE, which pt declined to continue while inpatient. Pt was started on oral risperidal at this time, titrated up to 4mg with good effect. Depakote was started as adjunct due to concerns of mood lability and irritability, titrated up to 1000mg with good effect. While inpatient, pt was recommended to restart invega sustenna AGUIRRE, though pt continuously declined. Pt was educated on the importance of medication compliance to continue improving mental health and to prevent rehospitalization, with pt voicing understanding and agreement. Pt was educated in the importance of stopping or reducing cannabis use and educated on its negative effects on mental health and psychosis, with pt voicing understanding to education. By discharge, pt's mood and behavior had been well controlled on the unit, with no behavioral issues. Pt denied SI/AVH and irritability was minimal. Pt was seen attending groups at times, though primarily keeping to self. Pt was medication compliant and free of any side effects from medication. Pt in agreement to continue seeing his outpatient psychiatrist at [facility] and to start individual therapy with them upon discharge.     Plan:  - Admit to [facility], 9.27 legal status  - Routine checks  - Unclear dose and last administration of monthly Invega Sustenna, will seek collateral from [facility]  	- c/w Risperdal 4mg qhs  -Adjunct with Depakote 500mg qhs for mood , titrate 750mg on 12/30, titrate 1000mg on 12/31  - Ativan 2mg and Haldol 5mg PO/IM prn agitation   - Admission labs and EKG reviewed and within normal limits (utox THC+)  - Will continue to provide updates to pt's mother   On initial assessment, pt presented with tenuous impulse control, irritable mood regarding continuation of invega sustenna AGUIRRE, which pt declined to continue while inpatient. Pt was started on oral risperidal at this time, titrated up to 4mg with good effect. Depakote was started as adjunct due to concerns of mood lability and irritability, titrated up to 1000mg with good effect. While inpatient, pt was recommended to restart invega sustenna AGUIRRE, though pt continuously declined. Pt was educated on the importance of medication compliance to continue improving mental health and to prevent rehospitalization, with pt voicing understanding and agreement. Pt was educated in the importance of stopping or reducing cannabis use and educated on its negative effects on mental health and psychosis, with pt voicing understanding to education. By discharge, pt's mood and behavior had been well controlled on the unit, with no behavioral issues. Pt denied SI/AVH and irritability was minimal. Pt was seen attending groups at times, though primarily keeping to self. Pt was medication compliant and free of any side effects from medication. Pt in agreement to continue seeing his outpatient psychiatrist at [facility] and to start individual therapy with them upon discharge.     Plan:  - Admit to [facility], 9.27 legal status  - Routine checks  - Unclear dose and last administration of monthly Invega Sustenna, will seek collateral from [facility]  	- c/w Risperdal 4mg qhs  -Adjunct with Depakote 500mg qhs for mood , titrate 750mg on 12/30, titrate 1000mg on 12/31 ( on 1/6)  - Ativan 2mg and Haldol 5mg PO/IM prn agitation   - Admission labs and EKG reviewed and within normal limits (utox THC+)  - Will continue to provide updates to pt's mother

## 2025-01-06 NOTE — BH INPATIENT PSYCHIATRY PROGRESS NOTE - NSTXCOPEINTERMD_PSY_ALL_CORE
psychopharmacology

## 2025-01-06 NOTE — BH INPATIENT PSYCHIATRY PROGRESS NOTE - NSBHCONSBHPROVDETAILS_PSY_A_CORE  FT
attempted to reach provider at [facility] [phone]
attempted to reach provider at [facility] [phone]
attempted to reach provider at [facility] [phone]
attempted to reach provider at [facility] [phone]
attempted to reach provider at [facility] [phone]
attempted to reach provider at [facility] [phone]
attempted to reach provider at [facility] [phone]
attempted to reach provider at [facility] [phone]
attempted to reach provider at [facility] [phone]
attempted to reach provider at [facility] [phone]
attempted to reach provider at [facility] [phone]
attempted to reach provider at [facility] [phone]

## 2025-01-06 NOTE — BH INPATIENT PSYCHIATRY PROGRESS NOTE - NSBHCHARTREVIEWVS_PSY_A_CORE FT
Vital Signs Last 24 Hrs  T(C): 36.9 (01-06-25 @ 08:31), Max: 36.9 (01-06-25 @ 08:31)  T(F): 98.4 (01-06-25 @ 08:31), Max: 98.4 (01-06-25 @ 08:31)  HR: --  BP: --  BP(mean): --  RR: 17 (01-06-25 @ 08:31) (17 - 17)  SpO2: --    Orthostatic VS  01-06-25 @ 08:31  Lying BP: --/-- HR: --  Sitting BP: 122/69 HR: 64  Standing BP: 113/70 HR: 74  Site: --  Mode: --  Orthostatic VS  01-05-25 @ 18:31  Lying BP: --/-- HR: --  Sitting BP: 109/70 HR: 71  Standing BP: 96/71 HR: 104  Site: --  Mode: --  Orthostatic VS  01-05-25 @ 08:33  Lying BP: --/-- HR: --  Sitting BP: 110/74 HR: 70  Standing BP: 115/75 HR: 75  Site: --  Mode: electronic  Orthostatic VS  01-04-25 @ 20:00  Lying BP: --/-- HR: --  Sitting BP: 121/61 HR: 74  Standing BP: 112/58 HR: 87  Site: upper left arm  Mode: electronic   Vital Signs Last 24 Hrs  T(C): 36.4 (01-07-25 @ 08:29), Max: 36.4 (01-07-25 @ 08:29)  T(F): 97.6 (01-07-25 @ 08:29), Max: 97.6 (01-07-25 @ 08:29)  HR: --  BP: --  BP(mean): --  RR: 16 (01-07-25 @ 08:29) (16 - 18)  SpO2: --    Orthostatic VS  01-07-25 @ 08:29  Lying BP: --/-- HR: --  Sitting BP: 128/87 HR: 88  Standing BP: 127/89 HR: 101  Site: --  Mode: electronic  Orthostatic VS  01-06-25 @ 19:34  Lying BP: --/-- HR: --  Sitting BP: 131/73 HR: 66  Standing BP: 90/77 HR: 79  Site: --  Mode: --  Orthostatic VS  01-06-25 @ 08:31  Lying BP: --/-- HR: --  Sitting BP: 122/69 HR: 64  Standing BP: 113/70 HR: 74  Site: --  Mode: --  Orthostatic VS  01-05-25 @ 18:31  Lying BP: --/-- HR: --  Sitting BP: 109/70 HR: 71  Standing BP: 96/71 HR: 104  Site: --  Mode: --

## 2025-01-06 NOTE — BH INPATIENT PSYCHIATRY DISCHARGE NOTE - OTHER PAST PSYCHIATRIC HISTORY (INCLUDE DETAILS REGARDING ONSET, COURSE OF ILLNESS, INPATIENT/OUTPATIENT TREATMENT)
dx schizophrenia (vs substance induced psychosis?), two prior hospitalizations at [facility], no prior suicide attempts

## 2025-01-06 NOTE — BH INPATIENT PSYCHIATRY PROGRESS NOTE - NSTXMEDICGOAL_PSY_ALL_CORE
Take all medications as prescribed
Be able to describe the benefit of medication/treatment
Take all medications as prescribed
Take all medications as prescribed
Be able to describe the benefit of medication/treatment
Be able to describe the benefit of medication/treatment
Take all medications as prescribed

## 2025-01-06 NOTE — BH INPATIENT PSYCHIATRY PROGRESS NOTE - NSTXMEDICINTERMD_PSY_ALL_CORE
psychopharmacology  invega sustenna AGUIRRE

## 2025-01-06 NOTE — BH INPATIENT PSYCHIATRY PROGRESS NOTE - NSTXPSYCHOINTERMD_PSY_ALL_CORE
psychopharmacology  continue risperidal

## 2025-01-06 NOTE — BH INPATIENT PSYCHIATRY PROGRESS NOTE - NSBHMETABOLICLABS_PSY_ALL_CORE
All labs not within last 12 months, ordered

## 2025-01-06 NOTE — BH INPATIENT PSYCHIATRY PROGRESS NOTE - NSTXPSYCHODATEEST_PSY_ALL_CORE
18-Dec-2024
01-Jan-2025
01-Jan-2025
18-Dec-2024
18-Dec-2024
01-Jan-2025
18-Dec-2024
18-Dec-2024

## 2025-01-06 NOTE — BH INPATIENT PSYCHIATRY DISCHARGE NOTE - NSDCCPCAREPLAN_GEN_ALL_CORE_FT
PRINCIPAL DISCHARGE DIAGNOSIS  Diagnosis: Schizophrenia  Assessment and Plan of Treatment:       SECONDARY DISCHARGE DIAGNOSES  Diagnosis: Cannabis use disorder  Assessment and Plan of Treatment:

## 2025-01-06 NOTE — BH INPATIENT PSYCHIATRY PROGRESS NOTE - NSTXMEDICDATEEST_PSY_ALL_CORE
18-Dec-2024
01-Jan-2025
18-Dec-2024
18-Dec-2024
01-Jan-2025
18-Dec-2024
01-Jan-2025
18-Dec-2024

## 2025-01-06 NOTE — BH INPATIENT PSYCHIATRY PROGRESS NOTE - NSTXPSYCHOGOAL_PSY_ALL_CORE
Will ask for PRN medication to manage hallucinations
Will identify 2 coping skills that help mitigate hallucinations
Will ask for PRN medication to manage hallucinations
Will identify 2 coping skills that help mitigate hallucinations
Will identify 2 coping skills that help mitigate hallucinations

## 2025-01-06 NOTE — BH INPATIENT PSYCHIATRY PROGRESS NOTE - NSTXMEDICDATETRGT_PSY_ALL_CORE
25-Dec-2024
25-Dec-2024
08-Jan-2025
25-Dec-2024
08-Jan-2025
25-Dec-2024
25-Dec-2024
08-Jan-2025

## 2025-01-06 NOTE — BH INPATIENT PSYCHIATRY PROGRESS NOTE - NSTXPSYCHODATETRGT_PSY_ALL_CORE
25-Dec-2024
08-Jan-2025
25-Dec-2024
08-Jan-2025
08-Jan-2025
25-Dec-2024

## 2025-01-06 NOTE — BH INPATIENT PSYCHIATRY PROGRESS NOTE - NSTXDISORGGOAL_PSY_ALL_CORE
Will demonstrate purposeful and predictable thoughts/behaviors by making a request
Will demonstrate the ability to maintain reality orientation and communicate clearly with others during 2 conversations with staff daily
Will demonstrate purposeful and predictable thoughts/behaviors by making a request
Will demonstrate the ability to maintain reality orientation and communicate clearly with others during 2 conversations with staff daily
Will demonstrate the ability to maintain reality orientation and communicate clearly with others during 2 conversations with staff daily
Will demonstrate purposeful and predictable thoughts/behaviors by making a request

## 2025-01-06 NOTE — BH INPATIENT PSYCHIATRY PROGRESS NOTE - NSTXCOPEDATEEST_PSY_ALL_CORE
18-Dec-2024
25-Dec-2024
18-Dec-2024
25-Dec-2024
25-Dec-2024
01-Jan-2025
25-Dec-2024
01-Jan-2025
01-Jan-2025

## 2025-01-06 NOTE — BH INPATIENT PSYCHIATRY DISCHARGE NOTE - REASON FOR ADMISSION
you were admitted to [facility] for increasing aggressive behavior at home in the context of not receiving invega sustenna.

## 2025-01-06 NOTE — BH INPATIENT PSYCHIATRY PROGRESS NOTE - NSTXIMPULSDATETRGT_PSY_ALL_CORE
26-Dec-2024
08-Jan-2025
26-Dec-2024
09-Jan-2025
26-Dec-2024
26-Dec-2024
09-Jan-2025
02-Jan-2025

## 2025-01-06 NOTE — BH INPATIENT PSYCHIATRY PROGRESS NOTE - NSBHFUPINTERVALCCFT_PSY_A_CORE
pt seen or agitation and aggression 

## 2025-01-06 NOTE — BH INPATIENT PSYCHIATRY PROGRESS NOTE - NSTXDCOTHRDATETRGT_PSY_ALL_CORE
09-Jan-2025
26-Dec-2024
02-Jan-2025
26-Dec-2024
02-Jan-2025
02-Jan-2025
09-Jan-2025
08-Jan-2025

## 2025-01-06 NOTE — BH INPATIENT PSYCHIATRY DISCHARGE NOTE - NSBHFUPINTERVALHXFT_PSY_A_CORE
Pt has continued to show improvement in symptoms. Pt states that irritability is much improved, denying anxiety, depression, or psychosis. On day of discharge, pt denies SI/HI/AVH and behavior has been well controlled. Pt reports that sleep and appetite have returned to baseline. Pt has been fully engaged in treatment on the unit, compliant with medications, and is in agreement to continue care in the outpatient setting. Pt is supported by family, who are protective factors. Pt is able to safety plan appropriately. Pt's risk cannot be further reduced with continued inpatient hospitalization. Pt is no longer an acute danger to self or others, and is stable for discharge home.   Discharge Progress Note Date and Time: 01-07-25 @ 08:55    Pt has continued to show improvement in symptoms. Pt states that irritability is much improved, denying anxiety, depression, or psychosis. On day of discharge, pt denies SI/HI/AVH and behavior has been well controlled. Pt reports that sleep and appetite have returned to baseline. Pt has been fully engaged in treatment on the unit, compliant with medications, and is in agreement to continue care in the outpatient setting. Pt is supported by family, who are protective factors. Pt is able to safety plan appropriately. Pt's risk cannot be further reduced with continued inpatient hospitalization. Pt is no longer an acute danger to self or others, and is stable for discharge home.

## 2025-01-06 NOTE — BH INPATIENT PSYCHIATRY PROGRESS NOTE - NSBHMSEBODY_PSY_A_CORE
Average build
Yes
Average build

## 2025-01-07 VITALS — TEMPERATURE: 98 F | RESPIRATION RATE: 16 BRPM

## 2025-01-07 PROCEDURE — 90853 GROUP PSYCHOTHERAPY: CPT

## 2025-01-07 NOTE — BH PSYCHOLOGY - GROUP THERAPY NOTE - NSBHPSYCHOLRESPCOMMENT_PSY_A_CORE FT
The patient appeared adequately groomed and casually dressed. Pt appeared moderately engaged in the group as evidenced by his willingness to independently verbally communicate during the discussion. Pt shared that movies often help him focus on the present moment. PT was oriented X3. Pt was appropriate with peers.

## 2025-01-07 NOTE — BH PSYCHOLOGY - GROUP THERAPY NOTE - NSPSYCHOLGRPCOGPT_PSY_A_CORE FT
Patient attended Cognitive Behavioral Therapy Group incorporating ACT-based concepts. The group started with a brief check-in asking Pts to share one word that someone in their lives might use to describe them. Members then engaged in a mindfulness of breath exercise and processed the experience. Group members were then given two quotes and engaged in discussion about their thoughts about mindfulness, specifically noticing thoughts and acknowledging without judgment. Lastly, group focused on engaging in a discussion about treating oneself like a friend. Group facilitator explained concepts, reinforced participation, and engaged patients in the discussion.

## 2025-01-20 NOTE — BH PATIENT PROFILE - HAS THE PATIENT EXPERIENCED ANY OF THE FOLLOWING WITHIN THE WEEK PRIOR TO ADMISSION?
Case Management Discharge Planning    Admission Date: 1/18/2025  GMLOS: 4.9  ALOS: 2    6-Clicks ADL Score: 19  6-Clicks Mobility Score: 24    Anticipated Discharge Dispo: Discharge Disposition: Discharged to home/self care (01)  Discharge Address: [address]    [place] [address]    DME Needed: No    Action(s) Taken: DC Assessment Complete (See below)    RNCM met with patient at bedside to complete assessment and discuss discharge planning. Introduced self and department roles; pt is alert and oriented X4 and able to verify demographics.     Patient reports he lives alone in a 2nd floor apartment. He is independent with ADLs/IADLs and does not use any DME. Pt reports he has the support of his friends; his sister is emergency contact.    Pt's PCP is Dr. Omaira Jacques; [facility] and [facility]. Pt reports income of $1426 per month from [employer].     Pt denies smoking cigarettes; uses marijuana; and does not drink alcohol. He denies MH concerns.     RNCM discussed discharge plan. Pt's goal is to return home when [facility]. Pt will transport self home.     1332- Patient is now requesting assistance with bus pass home. RNCM provided bus pass to patient at bedside. No other CM needs.     Escalations Completed: None    Medically Clear: Yes    Next Steps: F/U with medical team regarding D/C needs/ barriers.     Barriers to Discharge: None    Is the patient up for discharge tomorrow: No       Care Transition Team Assessment    Information Source  Orientation Level: Oriented X4  Information Given By: Patient  Informant's Name: Inocencio Shabazz  Who is responsible for making decisions for patient? : Patient    Readmission Evaluation  Is this a readmission?: No    Elopement Risk  Legal Hold: No  Ambulatory or Self Mobile in Wheelchair: Yes  Disoriented: No  Psychiatric Symptoms: None  History of Wandering: No  Elopement this Admit: No  Vocalizing Wanting to Leave: No  Displays Behaviors, Body Language  Wanting to Leave: No-Not at Risk for Elopement  Elopement Risk: Not at Risk for Elopement    Interdisciplinary Discharge Planning  Lives with - Patient's Self Care Capacity: Alone and Able to Care For Self  Patient or legal guardian wants to designate a caregiver: No  Support Systems: None  Housing / Facility: [address]    Discharge Preparedness  What is your plan after discharge?: Home with help  What are your discharge supports?: Sibling  Prior Functional Level: Ambulatory, Independent with Activities of Daily Living, Independent with Medication Management    Functional Assesment  Prior Functional Level: Ambulatory, Independent with Activities of Daily Living, Independent with Medication Management    Finances  Financial Barriers to Discharge: No  Prescription Coverage: Yes    Vision / Hearing Impairment  Vision Impairment : Yes  Right Eye Vision: Impaired, Wears Glasses  Left Eye Vision: Impaired, Wears Glasses  Hearing Impairment : No     Advance Directive  Advance Directive?: None  Advance Directive offered?: AD Booklet refused    Domestic Abuse  Possible Abuse/Neglect Reported to:: Not Applicable    Psychological Assessment  History of Substance Abuse: None  History of Psychiatric Problems: No    Discharge Risks or Barriers  Discharge risks or barriers?: No  Patient risk factors: Complex medical needs    Anticipated Discharge Information  Discharge Disposition: Discharged to home/self care (01)  Discharge Address: [address] [place] [id number]         no

## 2025-01-21 NOTE — SOCIAL WORK POST DISCHARGE FOLLOW UP NOTE - NSBHSWFOLLOWUP_PSY_ALL_CORE_FT
Lmsw attempted to call pt's mother due to pt not having cell phone. Lmsw explained [facility] was unable to confirm if pt attended his appointment 1/8/25. Heaven reports the pt did not attend and told her that the program needed documents from [facility] that they never received so they rescheduled pt for 1/21/25 at 5:30pm. lmsw explained she did send discharge summary right after pt was discharged but that  she would call [facility] and confirm they do not need anything. Lmsw called M[facility] and they report they received discharge summary from [facility] right away and that they are not in need of anything from [facility] and that pt is scheduled for an appointment today at 5:30pm. sw called Heaven back and confirmed that [facility] was not missing any documents and that pt's appointment for today is confirmed. At time of discharge the tx team determined pt is psychiatrically stable for discharge. Case is closed.

## 2025-04-04 NOTE — ED CDU PROVIDER SUBSEQUENT DAY NOTE - CARDIAC, MLM
Normal rate, regular rhythm.  Heart sounds S1, S2.  No murmurs, rubs or gallops.
done
